# Patient Record
Sex: FEMALE | Race: ASIAN | URBAN - METROPOLITAN AREA
[De-identification: names, ages, dates, MRNs, and addresses within clinical notes are randomized per-mention and may not be internally consistent; named-entity substitution may affect disease eponyms.]

---

## 2017-01-01 ENCOUNTER — ALLIED HEALTH/NURSE VISIT (OUTPATIENT)
Dept: CARDIOLOGY | Facility: CLINIC | Age: 77
End: 2017-01-01
Payer: COMMERCIAL

## 2017-01-01 ENCOUNTER — TELEPHONE (OUTPATIENT)
Dept: CARDIOLOGY | Facility: CLINIC | Age: 77
End: 2017-01-01

## 2017-01-01 ENCOUNTER — HOSPITAL ENCOUNTER (EMERGENCY)
Facility: CLINIC | Age: 77
Discharge: PSYCHIATRIC OR SUBSTANCE USE FACILITY | End: 2017-02-01
Attending: EMERGENCY MEDICINE | Admitting: EMERGENCY MEDICINE
Payer: COMMERCIAL

## 2017-01-01 ENCOUNTER — HOSPITAL ENCOUNTER (INPATIENT)
Facility: CLINIC | Age: 77
LOS: 6 days | Discharge: HOME | DRG: 885 | End: 2017-02-07
Attending: PSYCHIATRY & NEUROLOGY | Admitting: PSYCHIATRY & NEUROLOGY
Payer: COMMERCIAL

## 2017-01-01 ENCOUNTER — TELEPHONE (OUTPATIENT)
Dept: BEHAVIORAL HEALTH | Facility: CLINIC | Age: 77
End: 2017-01-01

## 2017-01-01 VITALS
OXYGEN SATURATION: 100 % | HEART RATE: 60 BPM | DIASTOLIC BLOOD PRESSURE: 87 MMHG | SYSTOLIC BLOOD PRESSURE: 176 MMHG | RESPIRATION RATE: 18 BRPM | TEMPERATURE: 97.9 F

## 2017-01-01 VITALS
WEIGHT: 108.8 LBS | TEMPERATURE: 97.9 F | OXYGEN SATURATION: 99 % | HEART RATE: 60 BPM | RESPIRATION RATE: 16 BRPM | DIASTOLIC BLOOD PRESSURE: 45 MMHG | SYSTOLIC BLOOD PRESSURE: 154 MMHG | HEIGHT: 60 IN | BODY MASS INDEX: 21.36 KG/M2

## 2017-01-01 DIAGNOSIS — I44.30 AV BLOCK: ICD-10-CM

## 2017-01-01 DIAGNOSIS — Z79.4 TYPE 2 DIABETES MELLITUS WITH DIABETIC NEUROPATHY, WITH LONG-TERM CURRENT USE OF INSULIN (H): ICD-10-CM

## 2017-01-01 DIAGNOSIS — R45.851 SUICIDAL IDEATION: ICD-10-CM

## 2017-01-01 DIAGNOSIS — E78.1 HYPERTRIGLYCERIDEMIA: ICD-10-CM

## 2017-01-01 DIAGNOSIS — E78.1 HYPERTRIGLYCERIDEMIA: Primary | ICD-10-CM

## 2017-01-01 DIAGNOSIS — Z95.0 CARDIAC PACEMAKER IN SITU: Primary | ICD-10-CM

## 2017-01-01 DIAGNOSIS — F33.1 MAJOR DEPRESSIVE DISORDER, RECURRENT EPISODE, MODERATE (H): Primary | ICD-10-CM

## 2017-01-01 DIAGNOSIS — I50.22 CHRONIC SYSTOLIC CONGESTIVE HEART FAILURE (H): Primary | ICD-10-CM

## 2017-01-01 DIAGNOSIS — E11.40 TYPE 2 DIABETES MELLITUS WITH DIABETIC NEUROPATHY, WITH LONG-TERM CURRENT USE OF INSULIN (H): ICD-10-CM

## 2017-01-01 DIAGNOSIS — I50.22 CHRONIC SYSTOLIC CONGESTIVE HEART FAILURE (H): ICD-10-CM

## 2017-01-01 LAB
ALT SERPL W P-5'-P-CCNC: 5 U/L (ref 5–30)
ANION GAP SERPL CALCULATED.3IONS-SCNC: 10 MMOL/L (ref 3–14)
BASOPHILS # BLD AUTO: 0 10E9/L (ref 0–0.2)
BASOPHILS NFR BLD AUTO: 0.4 %
BUN SERPL-MCNC: 57 MG/DL (ref 7–30)
CALCIUM SERPL-MCNC: 9.1 MG/DL (ref 8.5–10.1)
CHLORIDE SERPL-SCNC: 106 MMOL/L (ref 94–109)
CHOLEST SERPL-MCNC: 166 MG/DL
CO2 SERPL-SCNC: 25 MMOL/L (ref 20–32)
CREAT SERPL-MCNC: 1.38 MG/DL (ref 0.52–1.04)
DIFFERENTIAL METHOD BLD: ABNORMAL
DIGOXIN SERPL-MCNC: 1.9 UG/L (ref 0.5–2)
EOSINOPHIL # BLD AUTO: 0.2 10E9/L (ref 0–0.7)
EOSINOPHIL NFR BLD AUTO: 3.3 %
ERYTHROCYTE [DISTWIDTH] IN BLOOD BY AUTOMATED COUNT: 13 % (ref 10–15)
GFR SERPL CREATININE-BSD FRML MDRD: 37 ML/MIN/1.7M2
GLUCOSE BLDC GLUCOMTR-MCNC: 113 MG/DL (ref 70–99)
GLUCOSE BLDC GLUCOMTR-MCNC: 139 MG/DL (ref 70–99)
GLUCOSE BLDC GLUCOMTR-MCNC: 147 MG/DL (ref 70–99)
GLUCOSE BLDC GLUCOMTR-MCNC: 149 MG/DL (ref 70–99)
GLUCOSE BLDC GLUCOMTR-MCNC: 158 MG/DL (ref 70–99)
GLUCOSE BLDC GLUCOMTR-MCNC: 160 MG/DL (ref 70–99)
GLUCOSE BLDC GLUCOMTR-MCNC: 168 MG/DL (ref 70–99)
GLUCOSE BLDC GLUCOMTR-MCNC: 169 MG/DL (ref 70–99)
GLUCOSE BLDC GLUCOMTR-MCNC: 170 MG/DL (ref 70–99)
GLUCOSE BLDC GLUCOMTR-MCNC: 176 MG/DL (ref 70–99)
GLUCOSE BLDC GLUCOMTR-MCNC: 189 MG/DL (ref 70–99)
GLUCOSE BLDC GLUCOMTR-MCNC: 190 MG/DL (ref 70–99)
GLUCOSE BLDC GLUCOMTR-MCNC: 190 MG/DL (ref 70–99)
GLUCOSE BLDC GLUCOMTR-MCNC: 191 MG/DL (ref 70–99)
GLUCOSE BLDC GLUCOMTR-MCNC: 202 MG/DL (ref 70–99)
GLUCOSE BLDC GLUCOMTR-MCNC: 209 MG/DL (ref 70–99)
GLUCOSE BLDC GLUCOMTR-MCNC: 211 MG/DL (ref 70–99)
GLUCOSE BLDC GLUCOMTR-MCNC: 224 MG/DL (ref 70–99)
GLUCOSE BLDC GLUCOMTR-MCNC: 226 MG/DL (ref 70–99)
GLUCOSE BLDC GLUCOMTR-MCNC: 227 MG/DL (ref 70–99)
GLUCOSE BLDC GLUCOMTR-MCNC: 231 MG/DL (ref 70–99)
GLUCOSE BLDC GLUCOMTR-MCNC: 231 MG/DL (ref 70–99)
GLUCOSE BLDC GLUCOMTR-MCNC: 232 MG/DL (ref 70–99)
GLUCOSE BLDC GLUCOMTR-MCNC: 233 MG/DL (ref 70–99)
GLUCOSE BLDC GLUCOMTR-MCNC: 248 MG/DL (ref 70–99)
GLUCOSE BLDC GLUCOMTR-MCNC: 253 MG/DL (ref 70–99)
GLUCOSE BLDC GLUCOMTR-MCNC: 255 MG/DL (ref 70–99)
GLUCOSE BLDC GLUCOMTR-MCNC: 256 MG/DL (ref 70–99)
GLUCOSE BLDC GLUCOMTR-MCNC: 257 MG/DL (ref 70–99)
GLUCOSE BLDC GLUCOMTR-MCNC: 263 MG/DL (ref 70–99)
GLUCOSE BLDC GLUCOMTR-MCNC: 267 MG/DL (ref 70–99)
GLUCOSE BLDC GLUCOMTR-MCNC: 283 MG/DL (ref 70–99)
GLUCOSE BLDC GLUCOMTR-MCNC: 293 MG/DL (ref 70–99)
GLUCOSE BLDC GLUCOMTR-MCNC: 301 MG/DL (ref 70–99)
GLUCOSE BLDC GLUCOMTR-MCNC: 314 MG/DL (ref 70–99)
GLUCOSE BLDC GLUCOMTR-MCNC: 329 MG/DL (ref 70–99)
GLUCOSE BLDC GLUCOMTR-MCNC: 350 MG/DL (ref 70–99)
GLUCOSE BLDC GLUCOMTR-MCNC: 364 MG/DL (ref 70–99)
GLUCOSE BLDC GLUCOMTR-MCNC: 43 MG/DL (ref 70–99)
GLUCOSE BLDC GLUCOMTR-MCNC: 94 MG/DL (ref 70–99)
GLUCOSE BLDC GLUCOMTR-MCNC: 98 MG/DL (ref 70–99)
GLUCOSE SERPL-MCNC: 103 MG/DL (ref 70–99)
HBA1C MFR BLD: 8.1 % (ref 4.3–6)
HCT VFR BLD AUTO: 31.7 % (ref 35–47)
HDLC SERPL-MCNC: 27 MG/DL
HGB BLD-MCNC: 10.6 G/DL (ref 11.7–15.7)
IMM GRANULOCYTES # BLD: 0 10E9/L (ref 0–0.4)
IMM GRANULOCYTES NFR BLD: 0.2 %
INR PPP: 1.06 (ref 0.86–1.14)
INTERPRETATION ECG - MUSE: NORMAL
LDLC SERPL CALC-MCNC: ABNORMAL MG/DL
LYMPHOCYTES # BLD AUTO: 2 10E9/L (ref 0.8–5.3)
LYMPHOCYTES NFR BLD AUTO: 37 %
MCH RBC QN AUTO: 31 PG (ref 26.5–33)
MCHC RBC AUTO-ENTMCNC: 33.4 G/DL (ref 31.5–36.5)
MCV RBC AUTO: 93 FL (ref 78–100)
MONOCYTES # BLD AUTO: 0.5 10E9/L (ref 0–1.3)
MONOCYTES NFR BLD AUTO: 8.5 %
NEUTROPHILS # BLD AUTO: 2.7 10E9/L (ref 1.6–8.3)
NEUTROPHILS NFR BLD AUTO: 50.6 %
NONHDLC SERPL-MCNC: 139 MG/DL
NRBC # BLD AUTO: 0 10*3/UL
NRBC BLD AUTO-RTO: 0 /100
PLATELET # BLD AUTO: 190 10E9/L (ref 150–450)
POTASSIUM SERPL-SCNC: 4.2 MMOL/L (ref 3.4–5.3)
RBC # BLD AUTO: 3.42 10E12/L (ref 3.8–5.2)
SODIUM SERPL-SCNC: 141 MMOL/L (ref 133–144)
T4 FREE SERPL-MCNC: 0.74 NG/DL (ref 0.76–1.46)
TRIGL SERPL-MCNC: 654 MG/DL
TSH SERPL DL<=0.005 MIU/L-ACNC: 5.62 MU/L (ref 0.4–4)
WBC # BLD AUTO: 5.4 10E9/L (ref 4–11)

## 2017-01-01 PROCEDURE — 99231 SBSQ HOSP IP/OBS SF/LOW 25: CPT | Performed by: NURSE PRACTITIONER

## 2017-01-01 PROCEDURE — 97150 GROUP THERAPEUTIC PROCEDURES: CPT | Mod: GO

## 2017-01-01 PROCEDURE — 00000146 ZZHCL STATISTIC GLUCOSE BY METER IP

## 2017-01-01 PROCEDURE — 12400002 ZZH R&B MH SENIOR/ADOLESCENT

## 2017-01-01 PROCEDURE — 25900017 H RX MED GY IP 259 OP 259 PS 637: Performed by: PSYCHIATRY & NEUROLOGY

## 2017-01-01 PROCEDURE — 36415 COLL VENOUS BLD VENIPUNCTURE: CPT | Performed by: INTERNAL MEDICINE

## 2017-01-01 PROCEDURE — 80061 LIPID PANEL: CPT | Performed by: INTERNAL MEDICINE

## 2017-01-01 PROCEDURE — 25000131 ZZH RX MED GY IP 250 OP 636 PS 637: Performed by: PHYSICIAN ASSISTANT

## 2017-01-01 PROCEDURE — 36416 COLLJ CAPILLARY BLOOD SPEC: CPT | Performed by: PSYCHIATRY & NEUROLOGY

## 2017-01-01 PROCEDURE — 85025 COMPLETE CBC W/AUTO DIFF WBC: CPT | Performed by: EMERGENCY MEDICINE

## 2017-01-01 PROCEDURE — 25000132 ZZH RX MED GY IP 250 OP 250 PS 637: Performed by: PSYCHIATRY & NEUROLOGY

## 2017-01-01 PROCEDURE — 93005 ELECTROCARDIOGRAM TRACING: CPT

## 2017-01-01 PROCEDURE — 25000131 ZZH RX MED GY IP 250 OP 636 PS 637: Performed by: NURSE PRACTITIONER

## 2017-01-01 PROCEDURE — 90887 INTERPJ/EXPLNAJ RSLT PSYC XM: CPT

## 2017-01-01 PROCEDURE — 99223 1ST HOSP IP/OBS HIGH 75: CPT | Performed by: PHYSICIAN ASSISTANT

## 2017-01-01 PROCEDURE — 25000132 ZZH RX MED GY IP 250 OP 250 PS 637: Performed by: NURSE PRACTITIONER

## 2017-01-01 PROCEDURE — 84443 ASSAY THYROID STIM HORMONE: CPT | Performed by: EMERGENCY MEDICINE

## 2017-01-01 PROCEDURE — 83036 HEMOGLOBIN GLYCOSYLATED A1C: CPT | Performed by: PSYCHIATRY & NEUROLOGY

## 2017-01-01 PROCEDURE — 96374 THER/PROPH/DIAG INJ IV PUSH: CPT

## 2017-01-01 PROCEDURE — 80162 ASSAY OF DIGOXIN TOTAL: CPT | Performed by: EMERGENCY MEDICINE

## 2017-01-01 PROCEDURE — 93280 PM DEVICE PROGR EVAL DUAL: CPT | Performed by: INTERNAL MEDICINE

## 2017-01-01 PROCEDURE — 84439 ASSAY OF FREE THYROXINE: CPT | Performed by: EMERGENCY MEDICINE

## 2017-01-01 PROCEDURE — 36415 COLL VENOUS BLD VENIPUNCTURE: CPT | Performed by: EMERGENCY MEDICINE

## 2017-01-01 PROCEDURE — 90853 GROUP PSYCHOTHERAPY: CPT

## 2017-01-01 PROCEDURE — 85610 PROTHROMBIN TIME: CPT | Performed by: EMERGENCY MEDICINE

## 2017-01-01 PROCEDURE — 99223 1ST HOSP IP/OBS HIGH 75: CPT | Mod: AI | Performed by: NURSE PRACTITIONER

## 2017-01-01 PROCEDURE — 25800025 ZZH RX 258: Performed by: EMERGENCY MEDICINE

## 2017-01-01 PROCEDURE — 84460 ALANINE AMINO (ALT) (SGPT): CPT | Performed by: INTERNAL MEDICINE

## 2017-01-01 PROCEDURE — 99285 EMERGENCY DEPT VISIT HI MDM: CPT | Mod: 25

## 2017-01-01 PROCEDURE — 99207 ZZC NO CHARGE VISIT/PATIENT NOT SEEN: CPT | Performed by: PHYSICIAN ASSISTANT

## 2017-01-01 PROCEDURE — 96361 HYDRATE IV INFUSION ADD-ON: CPT

## 2017-01-01 PROCEDURE — 97532 ZZHC OT COGNITIVE SKILLS EA 15 MIN: CPT | Mod: GO

## 2017-01-01 PROCEDURE — 99232 SBSQ HOSP IP/OBS MODERATE 35: CPT | Performed by: NURSE PRACTITIONER

## 2017-01-01 PROCEDURE — 80048 BASIC METABOLIC PNL TOTAL CA: CPT | Performed by: EMERGENCY MEDICINE

## 2017-01-01 PROCEDURE — 99238 HOSP IP/OBS DSCHRG MGMT 30/<: CPT | Performed by: NURSE PRACTITIONER

## 2017-01-01 PROCEDURE — 25800025 ZZH RX 258

## 2017-01-01 PROCEDURE — 90791 PSYCH DIAGNOSTIC EVALUATION: CPT

## 2017-01-01 RX ORDER — ALUMINA, MAGNESIA, AND SIMETHICONE 2400; 2400; 240 MG/30ML; MG/30ML; MG/30ML
30 SUSPENSION ORAL EVERY 4 HOURS PRN
Status: DISCONTINUED | OUTPATIENT
Start: 2017-01-01 | End: 2017-01-01 | Stop reason: HOSPADM

## 2017-01-01 RX ORDER — CLOPIDOGREL BISULFATE 75 MG/1
75 TABLET ORAL DAILY
Status: DISCONTINUED | OUTPATIENT
Start: 2017-01-01 | End: 2017-01-01

## 2017-01-01 RX ORDER — SERTRALINE HYDROCHLORIDE 25 MG/1
25 TABLET, FILM COATED ORAL DAILY
Status: DISCONTINUED | OUTPATIENT
Start: 2017-01-01 | End: 2017-01-01

## 2017-01-01 RX ORDER — CARVEDILOL 12.5 MG/1
12.5 TABLET ORAL 2 TIMES DAILY WITH MEALS
Qty: 180 TABLET | Refills: 3 | Status: SHIPPED | OUTPATIENT
Start: 2017-01-01 | End: 2017-01-01

## 2017-01-01 RX ORDER — CALCIUM CARBONATE 500 MG/1
500 TABLET, CHEWABLE ORAL 3 TIMES DAILY PRN
Status: DISCONTINUED | OUTPATIENT
Start: 2017-01-01 | End: 2017-01-01 | Stop reason: HOSPADM

## 2017-01-01 RX ORDER — ASPIRIN 81 MG/1
81 TABLET ORAL DAILY
Status: DISCONTINUED | OUTPATIENT
Start: 2017-01-01 | End: 2017-01-01 | Stop reason: HOSPADM

## 2017-01-01 RX ORDER — SIMVASTATIN 40 MG
40 TABLET ORAL AT BEDTIME
Status: DISCONTINUED | OUTPATIENT
Start: 2017-01-01 | End: 2017-01-01 | Stop reason: HOSPADM

## 2017-01-01 RX ORDER — FENOFIBRATE 160 MG/1
160 TABLET ORAL DAILY
Status: DISCONTINUED | OUTPATIENT
Start: 2017-01-01 | End: 2017-01-01 | Stop reason: HOSPADM

## 2017-01-01 RX ORDER — DIGOXIN 125 MCG
125 TABLET ORAL DAILY
Status: DISCONTINUED | OUTPATIENT
Start: 2017-01-01 | End: 2017-01-01 | Stop reason: HOSPADM

## 2017-01-01 RX ORDER — HYDROXYZINE HYDROCHLORIDE 25 MG/1
25-50 TABLET, FILM COATED ORAL EVERY 4 HOURS PRN
Status: DISCONTINUED | OUTPATIENT
Start: 2017-01-01 | End: 2017-01-01 | Stop reason: HOSPADM

## 2017-01-01 RX ORDER — GABAPENTIN 600 MG/1
600 TABLET ORAL 3 TIMES DAILY
Status: DISCONTINUED | OUTPATIENT
Start: 2017-01-01 | End: 2017-01-01 | Stop reason: HOSPADM

## 2017-01-01 RX ORDER — CARVEDILOL 12.5 MG/1
12.5 TABLET ORAL 2 TIMES DAILY WITH MEALS
Status: DISCONTINUED | OUTPATIENT
Start: 2017-01-01 | End: 2017-01-01 | Stop reason: HOSPADM

## 2017-01-01 RX ORDER — ACETAMINOPHEN 325 MG/1
650 TABLET ORAL EVERY 4 HOURS PRN
Status: DISCONTINUED | OUTPATIENT
Start: 2017-01-01 | End: 2017-01-01 | Stop reason: HOSPADM

## 2017-01-01 RX ORDER — BUMETANIDE 0.5 MG/1
0.5 TABLET ORAL
Status: DISCONTINUED | OUTPATIENT
Start: 2017-01-01 | End: 2017-01-01 | Stop reason: HOSPADM

## 2017-01-01 RX ORDER — GEMFIBROZIL 600 MG/1
600 TABLET, FILM COATED ORAL
Status: DISCONTINUED | OUTPATIENT
Start: 2017-01-01 | End: 2017-01-01 | Stop reason: HOSPADM

## 2017-01-01 RX ORDER — LORAZEPAM 2 MG/ML
0.5 INJECTION INTRAMUSCULAR ONCE
Status: DISCONTINUED | OUTPATIENT
Start: 2017-01-01 | End: 2017-01-01 | Stop reason: HOSPADM

## 2017-01-01 RX ORDER — DEXTROSE MONOHYDRATE 25 G/50ML
25-50 INJECTION, SOLUTION INTRAVENOUS
Status: DISCONTINUED | OUTPATIENT
Start: 2017-01-01 | End: 2017-01-01

## 2017-01-01 RX ORDER — SPIRONOLACTONE 25 MG
12.5 TABLET ORAL DAILY
Status: DISCONTINUED | OUTPATIENT
Start: 2017-01-01 | End: 2017-01-01 | Stop reason: HOSPADM

## 2017-01-01 RX ORDER — TRAZODONE HYDROCHLORIDE 50 MG/1
50 TABLET, FILM COATED ORAL
Status: DISCONTINUED | OUTPATIENT
Start: 2017-01-01 | End: 2017-01-01 | Stop reason: HOSPADM

## 2017-01-01 RX ORDER — NICOTINE POLACRILEX 4 MG
15-30 LOZENGE BUCCAL
Status: DISCONTINUED | OUTPATIENT
Start: 2017-01-01 | End: 2017-01-01 | Stop reason: HOSPADM

## 2017-01-01 RX ORDER — CARVEDILOL 12.5 MG/1
12.5 TABLET ORAL 2 TIMES DAILY WITH MEALS
Qty: 180 TABLET | Refills: 3 | Status: SHIPPED | OUTPATIENT
Start: 2017-01-01

## 2017-01-01 RX ORDER — ISOSORBIDE MONONITRATE 30 MG/1
30 TABLET, EXTENDED RELEASE ORAL DAILY
Status: DISCONTINUED | OUTPATIENT
Start: 2017-01-01 | End: 2017-01-01 | Stop reason: HOSPADM

## 2017-01-01 RX ORDER — NICOTINE POLACRILEX 4 MG
15-30 LOZENGE BUCCAL
Status: DISCONTINUED | OUTPATIENT
Start: 2017-01-01 | End: 2017-01-01

## 2017-01-01 RX ORDER — DEXTROSE MONOHYDRATE 25 G/50ML
INJECTION, SOLUTION INTRAVENOUS
Status: COMPLETED
Start: 2017-01-01 | End: 2017-01-01

## 2017-01-01 RX ORDER — DEXTROSE MONOHYDRATE 25 G/50ML
50 INJECTION, SOLUTION INTRAVENOUS ONCE
Status: COMPLETED | OUTPATIENT
Start: 2017-01-01 | End: 2017-01-01

## 2017-01-01 RX ORDER — EZETIMIBE 10 MG/1
10 TABLET ORAL DAILY
Status: DISCONTINUED | OUTPATIENT
Start: 2017-01-01 | End: 2017-01-01 | Stop reason: HOSPADM

## 2017-01-01 RX ORDER — DEXTROSE MONOHYDRATE 25 G/50ML
25-50 INJECTION, SOLUTION INTRAVENOUS
Status: DISCONTINUED | OUTPATIENT
Start: 2017-01-01 | End: 2017-01-01 | Stop reason: HOSPADM

## 2017-01-01 RX ORDER — ACETAMINOPHEN 325 MG/1
650 TABLET ORAL EVERY 4 HOURS PRN
Status: DISCONTINUED | OUTPATIENT
Start: 2017-01-01 | End: 2017-01-01

## 2017-01-01 RX ORDER — FENOFIBRATE 160 MG/1
160 TABLET ORAL DAILY
Qty: 90 TABLET | Refills: 3 | Status: SHIPPED | OUTPATIENT
Start: 2017-01-01

## 2017-01-01 RX ADMIN — CARVEDILOL 12.5 MG: 12.5 TABLET, FILM COATED ORAL at 09:14

## 2017-01-01 RX ADMIN — GEMFIBROZIL 600 MG: 600 TABLET, FILM COATED ORAL at 16:14

## 2017-01-01 RX ADMIN — EZETIMIBE 10 MG: 10 TABLET ORAL at 08:25

## 2017-01-01 RX ADMIN — GABAPENTIN 600 MG: 600 TABLET, FILM COATED ORAL at 13:20

## 2017-01-01 RX ADMIN — GABAPENTIN 600 MG: 600 TABLET, FILM COATED ORAL at 08:38

## 2017-01-01 RX ADMIN — CARVEDILOL 12.5 MG: 12.5 TABLET, FILM COATED ORAL at 08:59

## 2017-01-01 RX ADMIN — FENOFIBRATE 160 MG: 160 TABLET ORAL at 08:59

## 2017-01-01 RX ADMIN — GABAPENTIN 600 MG: 600 TABLET, FILM COATED ORAL at 21:32

## 2017-01-01 RX ADMIN — GABAPENTIN 600 MG: 600 TABLET, FILM COATED ORAL at 21:23

## 2017-01-01 RX ADMIN — FENOFIBRATE 160 MG: 160 TABLET ORAL at 09:09

## 2017-01-01 RX ADMIN — DEXTROSE MONOHYDRATE 50 ML: 25 INJECTION, SOLUTION INTRAVENOUS at 16:01

## 2017-01-01 RX ADMIN — DIGOXIN 125 MCG: 125 TABLET ORAL at 08:59

## 2017-01-01 RX ADMIN — ASPIRIN 81 MG: 81 TABLET, COATED ORAL at 09:09

## 2017-01-01 RX ADMIN — GEMFIBROZIL 600 MG: 600 TABLET, FILM COATED ORAL at 09:14

## 2017-01-01 RX ADMIN — EZETIMIBE 10 MG: 10 TABLET ORAL at 09:07

## 2017-01-01 RX ADMIN — DIGOXIN 125 MCG: 125 TABLET ORAL at 09:08

## 2017-01-01 RX ADMIN — SIMVASTATIN 40 MG: 40 TABLET, FILM COATED ORAL at 21:23

## 2017-01-01 RX ADMIN — DIGOXIN 125 MCG: 125 TABLET ORAL at 08:37

## 2017-01-01 RX ADMIN — SIMVASTATIN 40 MG: 40 TABLET, FILM COATED ORAL at 22:38

## 2017-01-01 RX ADMIN — FENOFIBRATE 160 MG: 160 TABLET ORAL at 09:07

## 2017-01-01 RX ADMIN — GEMFIBROZIL 600 MG: 600 TABLET, FILM COATED ORAL at 06:41

## 2017-01-01 RX ADMIN — GEMFIBROZIL 600 MG: 600 TABLET, FILM COATED ORAL at 17:13

## 2017-01-01 RX ADMIN — CARVEDILOL 12.5 MG: 12.5 TABLET, FILM COATED ORAL at 09:09

## 2017-01-01 RX ADMIN — EZETIMIBE 10 MG: 10 TABLET ORAL at 09:15

## 2017-01-01 RX ADMIN — GABAPENTIN 600 MG: 600 TABLET, FILM COATED ORAL at 22:38

## 2017-01-01 RX ADMIN — CARVEDILOL 12.5 MG: 12.5 TABLET, FILM COATED ORAL at 22:38

## 2017-01-01 RX ADMIN — GABAPENTIN 600 MG: 600 TABLET, FILM COATED ORAL at 14:59

## 2017-01-01 RX ADMIN — GABAPENTIN 600 MG: 600 TABLET, FILM COATED ORAL at 09:14

## 2017-01-01 RX ADMIN — GABAPENTIN 600 MG: 600 TABLET, FILM COATED ORAL at 14:03

## 2017-01-01 RX ADMIN — INSULIN GLARGINE 45 UNITS: 100 INJECTION, SOLUTION SUBCUTANEOUS at 09:02

## 2017-01-01 RX ADMIN — BUMETANIDE 0.5 MG: 0.5 TABLET ORAL at 09:13

## 2017-01-01 RX ADMIN — Medication 12.5 MG: at 09:09

## 2017-01-01 RX ADMIN — FENOFIBRATE 160 MG: 160 TABLET ORAL at 08:26

## 2017-01-01 RX ADMIN — SIMVASTATIN 40 MG: 40 TABLET, FILM COATED ORAL at 20:30

## 2017-01-01 RX ADMIN — SERTRALINE HYDROCHLORIDE 50 MG: 50 TABLET ORAL at 08:24

## 2017-01-01 RX ADMIN — GABAPENTIN 600 MG: 600 TABLET, FILM COATED ORAL at 21:19

## 2017-01-01 RX ADMIN — CALCIUM CARBONATE (ANTACID) CHEW TAB 500 MG 500 MG: 500 CHEW TAB at 14:59

## 2017-01-01 RX ADMIN — CARVEDILOL 12.5 MG: 12.5 TABLET, FILM COATED ORAL at 18:05

## 2017-01-01 RX ADMIN — Medication 12.5 MG: at 08:59

## 2017-01-01 RX ADMIN — ASPIRIN 81 MG: 81 TABLET, COATED ORAL at 08:38

## 2017-01-01 RX ADMIN — GABAPENTIN 600 MG: 600 TABLET, FILM COATED ORAL at 09:09

## 2017-01-01 RX ADMIN — DIGOXIN 125 MCG: 125 TABLET ORAL at 08:25

## 2017-01-01 RX ADMIN — ALUMINUM HYDROXIDE, MAGNESIUM HYDROXIDE, AND DIMETHICONE 30 ML: 400; 400; 40 SUSPENSION ORAL at 21:51

## 2017-01-01 RX ADMIN — CARVEDILOL 12.5 MG: 12.5 TABLET, FILM COATED ORAL at 17:13

## 2017-01-01 RX ADMIN — GABAPENTIN 600 MG: 600 TABLET, FILM COATED ORAL at 20:30

## 2017-01-01 RX ADMIN — GABAPENTIN 600 MG: 600 TABLET, FILM COATED ORAL at 08:24

## 2017-01-01 RX ADMIN — CARVEDILOL 12.5 MG: 12.5 TABLET, FILM COATED ORAL at 17:20

## 2017-01-01 RX ADMIN — CARVEDILOL 12.5 MG: 12.5 TABLET, FILM COATED ORAL at 08:24

## 2017-01-01 RX ADMIN — EZETIMIBE 10 MG: 10 TABLET ORAL at 08:38

## 2017-01-01 RX ADMIN — SIMVASTATIN 40 MG: 40 TABLET, FILM COATED ORAL at 21:44

## 2017-01-01 RX ADMIN — CARVEDILOL 12.5 MG: 12.5 TABLET, FILM COATED ORAL at 09:07

## 2017-01-01 RX ADMIN — BUMETANIDE 0.5 MG: 0.5 TABLET ORAL at 08:59

## 2017-01-01 RX ADMIN — FENOFIBRATE 160 MG: 160 TABLET ORAL at 09:13

## 2017-01-01 RX ADMIN — CARVEDILOL 12.5 MG: 12.5 TABLET, FILM COATED ORAL at 17:28

## 2017-01-01 RX ADMIN — GABAPENTIN 600 MG: 600 TABLET, FILM COATED ORAL at 13:50

## 2017-01-01 RX ADMIN — GABAPENTIN 600 MG: 600 TABLET, FILM COATED ORAL at 08:59

## 2017-01-01 RX ADMIN — GABAPENTIN 600 MG: 600 TABLET, FILM COATED ORAL at 09:08

## 2017-01-01 RX ADMIN — ASPIRIN 81 MG: 81 TABLET, COATED ORAL at 09:06

## 2017-01-01 RX ADMIN — SERTRALINE HYDROCHLORIDE 50 MG: 50 TABLET ORAL at 09:15

## 2017-01-01 RX ADMIN — EZETIMIBE 10 MG: 10 TABLET ORAL at 09:06

## 2017-01-01 RX ADMIN — INSULIN ASPART 5 UNITS: 100 INJECTION, SOLUTION INTRAVENOUS; SUBCUTANEOUS at 17:37

## 2017-01-01 RX ADMIN — INSULIN ASPART 1 UNITS: 100 INJECTION, SOLUTION INTRAVENOUS; SUBCUTANEOUS at 09:03

## 2017-01-01 RX ADMIN — ASPIRIN 81 MG: 81 TABLET, COATED ORAL at 09:14

## 2017-01-01 RX ADMIN — CLOPIDOGREL 75 MG: 75 TABLET, FILM COATED ORAL at 08:38

## 2017-01-01 RX ADMIN — DIGOXIN 125 MCG: 125 TABLET ORAL at 09:15

## 2017-01-01 RX ADMIN — BUMETANIDE 0.5 MG: 0.5 TABLET ORAL at 09:08

## 2017-01-01 RX ADMIN — ISOSORBIDE MONONITRATE 30 MG: 30 TABLET, EXTENDED RELEASE ORAL at 08:59

## 2017-01-01 RX ADMIN — ALUMINUM HYDROXIDE, MAGNESIUM HYDROXIDE, AND DIMETHICONE 30 ML: 400; 400; 40 SUSPENSION ORAL at 20:26

## 2017-01-01 RX ADMIN — ASPIRIN 81 MG: 81 TABLET, COATED ORAL at 08:26

## 2017-01-01 RX ADMIN — ISOSORBIDE MONONITRATE 30 MG: 30 TABLET, EXTENDED RELEASE ORAL at 09:09

## 2017-01-01 RX ADMIN — INSULIN ASPART 2 UNITS: 100 INJECTION, SOLUTION INTRAVENOUS; SUBCUTANEOUS at 12:51

## 2017-01-01 RX ADMIN — SERTRALINE HYDROCHLORIDE 50 MG: 50 TABLET ORAL at 09:09

## 2017-01-01 RX ADMIN — INSULIN GLARGINE 45 UNITS: 100 INJECTION, SOLUTION SUBCUTANEOUS at 12:47

## 2017-01-01 RX ADMIN — BUMETANIDE 0.5 MG: 0.5 TABLET ORAL at 09:09

## 2017-01-01 RX ADMIN — SIMVASTATIN 40 MG: 40 TABLET, FILM COATED ORAL at 21:19

## 2017-01-01 RX ADMIN — GEMFIBROZIL 600 MG: 600 TABLET, FILM COATED ORAL at 06:44

## 2017-01-01 RX ADMIN — INSULIN ASPART 2 UNITS: 100 INJECTION, SOLUTION INTRAVENOUS; SUBCUTANEOUS at 12:48

## 2017-01-01 RX ADMIN — ISOSORBIDE MONONITRATE 30 MG: 30 TABLET, EXTENDED RELEASE ORAL at 08:38

## 2017-01-01 RX ADMIN — FENOFIBRATE 160 MG: 160 TABLET ORAL at 08:38

## 2017-01-01 RX ADMIN — BUMETANIDE 0.5 MG: 0.5 TABLET ORAL at 16:14

## 2017-01-01 RX ADMIN — GEMFIBROZIL 600 MG: 600 TABLET, FILM COATED ORAL at 06:40

## 2017-01-01 RX ADMIN — ISOSORBIDE MONONITRATE 30 MG: 30 TABLET, EXTENDED RELEASE ORAL at 09:14

## 2017-01-01 RX ADMIN — ISOSORBIDE MONONITRATE 30 MG: 30 TABLET, EXTENDED RELEASE ORAL at 08:25

## 2017-01-01 RX ADMIN — GABAPENTIN 600 MG: 600 TABLET, FILM COATED ORAL at 14:01

## 2017-01-01 RX ADMIN — BUMETANIDE 0.5 MG: 0.5 TABLET ORAL at 08:26

## 2017-01-01 RX ADMIN — Medication 12.5 MG: at 09:13

## 2017-01-01 RX ADMIN — BUMETANIDE 0.5 MG: 0.5 TABLET ORAL at 16:04

## 2017-01-01 RX ADMIN — SERTRALINE HYDROCHLORIDE 25 MG: 25 TABLET ORAL at 09:07

## 2017-01-01 RX ADMIN — GEMFIBROZIL 600 MG: 600 TABLET, FILM COATED ORAL at 17:21

## 2017-01-01 RX ADMIN — BUMETANIDE 0.5 MG: 0.5 TABLET ORAL at 17:21

## 2017-01-01 RX ADMIN — GABAPENTIN 600 MG: 600 TABLET, FILM COATED ORAL at 13:17

## 2017-01-01 RX ADMIN — INSULIN GLARGINE 57 UNITS: 100 INJECTION, SOLUTION SUBCUTANEOUS at 09:11

## 2017-01-01 RX ADMIN — BUMETANIDE 0.5 MG: 0.5 TABLET ORAL at 08:38

## 2017-01-01 RX ADMIN — SIMVASTATIN 40 MG: 40 TABLET, FILM COATED ORAL at 21:32

## 2017-01-01 RX ADMIN — Medication 12.5 MG: at 08:24

## 2017-01-01 RX ADMIN — Medication 12.5 MG: at 08:38

## 2017-01-01 RX ADMIN — SERTRALINE HYDROCHLORIDE 25 MG: 25 TABLET ORAL at 08:38

## 2017-01-01 RX ADMIN — GEMFIBROZIL 600 MG: 600 TABLET, FILM COATED ORAL at 08:37

## 2017-01-01 RX ADMIN — BUMETANIDE 0.5 MG: 0.5 TABLET ORAL at 16:52

## 2017-01-01 RX ADMIN — GEMFIBROZIL 600 MG: 600 TABLET, FILM COATED ORAL at 16:04

## 2017-01-01 RX ADMIN — ISOSORBIDE MONONITRATE 30 MG: 30 TABLET, EXTENDED RELEASE ORAL at 09:08

## 2017-01-01 RX ADMIN — GEMFIBROZIL 600 MG: 600 TABLET, FILM COATED ORAL at 16:52

## 2017-01-01 RX ADMIN — GABAPENTIN 600 MG: 600 TABLET, FILM COATED ORAL at 19:51

## 2017-01-01 RX ADMIN — ACETAMINOPHEN 650 MG: 325 TABLET, FILM COATED ORAL at 22:33

## 2017-01-01 RX ADMIN — TRAZODONE HYDROCHLORIDE 50 MG: 50 TABLET ORAL at 22:33

## 2017-01-01 RX ADMIN — EZETIMIBE 10 MG: 10 TABLET ORAL at 09:09

## 2017-01-01 RX ADMIN — DEXTROSE AND SODIUM CHLORIDE: 5; 450 INJECTION, SOLUTION INTRAVENOUS at 16:17

## 2017-01-01 RX ADMIN — CARVEDILOL 12.5 MG: 12.5 TABLET, FILM COATED ORAL at 08:38

## 2017-01-01 RX ADMIN — SERTRALINE HYDROCHLORIDE 50 MG: 50 TABLET ORAL at 08:59

## 2017-01-01 RX ADMIN — Medication 12.5 MG: at 09:08

## 2017-01-01 RX ADMIN — GEMFIBROZIL 600 MG: 600 TABLET, FILM COATED ORAL at 06:46

## 2017-01-01 RX ADMIN — ACETAMINOPHEN 650 MG: 325 TABLET, FILM COATED ORAL at 22:36

## 2017-01-01 RX ADMIN — ASPIRIN 81 MG: 81 TABLET, COATED ORAL at 08:59

## 2017-01-01 RX ADMIN — BUMETANIDE 0.5 MG: 0.5 TABLET ORAL at 17:13

## 2017-01-01 RX ADMIN — DIGOXIN 125 MCG: 125 TABLET ORAL at 09:09

## 2017-01-01 RX ADMIN — CARVEDILOL 12.5 MG: 12.5 TABLET, FILM COATED ORAL at 17:36

## 2017-01-01 ASSESSMENT — ACTIVITIES OF DAILY LIVING (ADL)
DRESS: INDEPENDENT;STREET CLOTHES
ORAL_HYGIENE: WITH ASSISTANCE
GROOMING: INDEPENDENT
DRESS: WITH ASSISTANCE
ORAL_HYGIENE: INDEPENDENT
LAUNDRY: UNABLE TO COMPLETE
HYGIENE/GROOMING: INDEPENDENT
LAUNDRY: UNABLE TO COMPLETE
DRESS: INDEPENDENT
GROOMING: WITH ASSISTANCE
DRESS: WITH ASSISTANCE
GROOMING: INDEPENDENT
ORAL_HYGIENE: INDEPENDENT
ORAL_HYGIENE: WITH ASSISTANCE
GROOMING: WITH ASSISTANCE
DRESS: INDEPENDENT
LAUNDRY: UNABLE TO COMPLETE
ORAL_HYGIENE: INDEPENDENT
GROOMING: WITH ASSISTANCE
GROOMING: INDEPENDENT
ORAL_HYGIENE: INDEPENDENT
ORAL_HYGIENE: INDEPENDENT
DRESS: SCRUBS (BEHAVIORAL HEALTH)
GROOMING: INDEPENDENT
DRESS: SCRUBS (BEHAVIORAL HEALTH);WITH ASSISTANCE
DRESS: STREET CLOTHES;INDEPENDENT
DRESS: WITH ASSISTANCE
GROOMING: WITH ASSISTANCE
LAUNDRY: UNABLE TO COMPLETE
DRESS: WITH ASSISTANCE
GROOMING: INDEPENDENT
GROOMING: WITH ASSISTANCE
ORAL_HYGIENE: WITH ASSISTANCE
ORAL_HYGIENE: INDEPENDENT
ORAL_HYGIENE: WITH ASSISTANCE
ORAL_HYGIENE: INDEPENDENT
DRESS: WITH ASSISTANCE;STREET CLOTHES

## 2017-01-01 ASSESSMENT — ENCOUNTER SYMPTOMS: CONFUSION: 0

## 2017-01-13 NOTE — PROGRESS NOTES
[facility] Accent (D) Pacemaker Device Check  AP: 75 % : 10 %  Mode: DDDR 60--120        Underlying Rhythm: SB in the 30's with a first degree AVB  Heart Rate: adequate variability  Sensing: WNL    Pacing Threshold: WNL   Impedance: WNL  Battery Status: 5.8 yrs estimated longevity  Atrial Arrhythmia: none  Ventricular Arrhythmia: none  Note: Presenting rhythm showed AP/ at 70 bpm, but surface EKG showed intrinsic QRS complex even though device was V pacing. Ran strips in AAI and VVI to compare paced and sensed QRS. Discussed with Jayde RN and Anjum RN. Attempted AAI pacing at 100bpm, pt had 4:1 AV block, so unable to reprogram to AAI or VIP.   Setting Change: Lowered LRL from 70 to 60 and increased paced AV delay from 300ms to 350ms to encourage intrinsic V response. Presenting rhythm now AP/VS at 60 bpm.     Care Plan: office teletrace in 3 months. Patient's daughter did not have her calendar with, provided phone number for daughter to call and schedule, order placed in Epic.    Rafaela

## 2017-02-01 PROBLEM — F32.A DEPRESSION: Status: ACTIVE | Noted: 2017-01-01

## 2017-02-01 NOTE — ED NOTES
ATTEMPTED TO CALL REPORT TO  [facility] @[phone]. IT WAS STATED THAT THEY WERE UNABLE TO TAKE REPORT AT THIS TIME AND THEY WOULD CALL WHEN READY.

## 2017-02-01 NOTE — ED NOTES
ATTEMPTED TO CALL REPORT TO [facility]B [facility] @[phone]. IT WAS STATED THAT THEY WERE UNABLE TO TAKE REPORT AT THIS TIME AND THEY WOULD CALL WHEN READY, MAYBE AROUND 6PM.
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
## 2017-02-01 NOTE — TELEPHONE ENCOUNTER
Daughter called stating that pt is supposed to be scheduled for an appt with Dr. Baldwin. Orders are already entered. Writer tx pt daughter to scheduling. Pt scheduled for 3/22/17 with Dr. Baldwin

## 2017-02-01 NOTE — ED NOTES
Assisted with patient triage (ambulance). While speaking with the Pt. She had told me that she normally stays at her daughters house. When she is their she cleans the house all day and when the daughter gets home she will find a piece of paper or a dirty spot and will yell at her and tell Pt. Why do you even live here. The Pt. Went on to tell me that this happens quite often. There is also some confusion about the daughters  or boyfriend also saying some of the same things to the Pt. RN was notified

## 2017-02-01 NOTE — ED NOTES
Assisted with Pt. Ambulance triage, Applied monitoring devices (BP, and pulse ox) onto Patient. Oral temp taken

## 2017-02-01 NOTE — IP AVS SNAPSHOT
[facility]    [address]    [place] [address]    Phone:  [phone]                                       After Visit Summary   2/1/2017    Ramón Galarza    MRN: [id number]           After Visit Summary Signature Page     I have received my discharge instructions, and my questions have been answered. I have discussed any challenges I see with this plan with the nurse or doctor.    ..........................................................................................................................................  Patient/Patient Representative Signature      ..........................................................................................................................................  Patient Representative Print Name and Relationship to Patient    ..................................................               ................................................  Date                                            Time    ..........................................................................................................................................  Reviewed by Signature/Title    ...................................................              ..............................................  Date                                                            Time

## 2017-02-01 NOTE — IP AVS SNAPSHOT
MRN:[id number]                      After Visit Summary   2/1/2017    Ramón Galarza    MRN: [id number]           Thank you!     Thank you for choosing [facility] for your care. Our goal is always to provide you with excellent care.        Patient Information     Date Of Birth          1940        About your hospital stay     You were admitted on:  February 1, 2017 You last received care in the:  [address]    You were discharged on:  February 7, 2017       Who to Call     For medical emergencies, please call 911.  For non-urgent questions about your medical care, please call your primary care provider or clinic, [phone]          Attending Provider     Provider    Yaneth Miranda MD       Primary Care Provider Office Phone # Fax #    Lashaun Valenzuela [phone]       [facility] [address] [facility] [address]  [facility] [address]        Your next 10 appointments already scheduled     Mar 22, 2017  9:00 AM   Return Visit with Bahman Baldwin MD   [facility]    [address]  [facility] [address] [phone]            Apr 19, 2017  8:10 AM   Pacemaker Check with RU DCR2   [facility])    [address]  [facility] [id number] [phone]              Further instructions from your care team       Behavioral Discharge Planning and Instructions   Summary:   You were admitted to the [facility] on February 1, 2017 for suicidal ideation. While on the unit, your symptoms stabilized. You deny any current suicidal or homicidal ideation. You are discharged today to [address] [place], [place] [address].  Phone number: [phone]    Main Diagnosis:   Depression.     Major Treatments, Procedures and Findings:   Dr. Miranda, your treating psychiatrist, adjusted your medications and managed your care  throughout your stay. An internal medicine consult was completed during your stay. You had the opportunity to participate in treatment programming while on the unit including occupational therapy, mental health support and education and spiritual services.     Symptoms to Report:   Increased confusion, losing more sleep, mood getting worse, and/or thoughts of suicide.     Lifestyle Adjustment:   Stay in touch with your psychiatrist for any questions, and contact the doctor as soon as you experience a recurrence of depressive symptoms. Don't wait for the symptoms to progress to severe before getting help.    Follow-up Appointments:     Pt's daughter, Ricarda, will schedule all follow up appointments.    :  Lydia Dinero  [facility]  [phone]    Resources:   [facility] Crisis Team (24 hour/7days a week): [phone].            Crisis Intervention: 919.801.2554 or 008-213-4451 (TTY: 173.700.1623). Call anytime for help.   [place] on Mental Illness (www.[place].org): 331.296.3157 or 120-226-0077.   Mental Health Consumer/Survivor Network of [place] (www.mhcsn.net): 837.597.8724 or 706-080-8082  Mental Health Association of [place] (www.mentalhealth.org): 508.348.1707 or 245-541-0605    General Medication Instructions:   See your medication sheet(s) for instructions.   Take all medicines as directed. Make no changes unless your doctor suggests them.   Go to all your doctor visits.   Be sure to have all your required lab tests. This way, your medicines can be refilled on time.   Do not use any drugs not prescribed by your doctor.   Avoid alcohol.    The treatment team has appreciated the opportunity to work with you.  We wish you the best in the future. You will be receiving a follow up call within the next three days from a representative from behavioral health. You have identified the best phone number to reach you as [phone]. If you have any questions or concerns our unit number is  [phone].      DIABETES PLAN-  Lantus 57 units every  Morning  Novolog 10 units with each meal  Check blood sugar before meals and at bedtime  Report to diabetes clinic ([facility] or Dr. Arias) if glucose running consistently less than 100 or greater than 250.    Pending Results     No orders found from 2017 to 2017.            Admission Information        Provider Department Dept Phone    2017 Yaneth Miranda MD  [address] [phone]      Your Vitals Were     Blood Pressure Pulse Temperature    154/45 mmHg 60 97.9  F (36.6  C) (Tympanic)    Respirations Height Weight    16 1.524 m (5') 49.351 kg (108 lb 12.8 oz)    BMI (Body Mass Index) Pulse Oximetry       21.25 kg/m2 99%       MyChart Information     [employer] lets you send messages to your doctor, view your test results, renew your prescriptions, schedule appointments and more. To sign up, go to [email or web address] . Click on \"Log in\" on the left side of the screen, which will take you to the Welcome page. Then click on \"Sign up Now\" on the right side of the page.     You will be asked to enter the access code listed below, as well as some personal information. Please follow the directions to create your username and password.     Your access code is: [email or web address]  Expires: 2017 12:45 PM     Your access code will  in 90 days. If you need help or a new code, please call your [facility] clinic or [phone].        Care EveryWhere ID     This is your Care EveryWhere ID. This could be used by other organizations to access your [facility] medical records  [id number]           Review of your medicines      START taking        Dose / Directions    insulin aspart 100 UNIT/ML injection   Commonly known as:  NovoLOG PEN   Used for:  Type 2 diabetes mellitus with diabetic neuropathy, with long-term current use of insulin (H)        Dose:  10 Units   Inject 10 Units Subcutaneous 3 times daily (with meals)   Quantity:  9 mL   Refills:  0    "      CONTINUE these medicines which may have CHANGED, or have new prescriptions. If we are uncertain of the size of tablets/capsules you have at home, strength may be listed as something that might have changed.        Dose / Directions    insulin glargine 100 UNIT/ML injection   Commonly known as:  LANTUS   This may have changed:    - how much to take  - when to take this   Used for:  Type 2 diabetes mellitus with diabetic neuropathy, with long-term current use of insulin (H)        Dose:  57 Units   Inject 57 Units Subcutaneous every morning (before breakfast)   Quantity:  17.1 mL   Refills:  0       sertraline 50 MG tablet   Commonly known as:  ZOLOFT   This may have changed:    - medication strength  - how much to take   Used for:  Major depressive disorder, recurrent episode, moderate (H)        Dose:  50 mg   Take 1 tablet (50 mg) by mouth daily   Quantity:  30 tablet   Refills:  0         CONTINUE these medicines which have NOT CHANGED        Dose / Directions    aspirin 81 MG tablet   Used for:  Type 2 diabetes, HbA1C goal < 8% (H), CAD (coronary artery disease), PAD (peripheral artery disease) (H)        Dose:  1 tablet   Take 1 tablet by mouth daily.   Quantity:  3 tablet   Refills:  3       bumetanide 0.5 MG tablet   Commonly known as:  BUMEX   Used for:  CHF (congestive heart failure) (H), CAD (coronary artery disease), Acute MI, inferolateral wall, subsequent episode of care (H)        Dose:  0.5 mg   Take 1 tablet (0.5 mg) by mouth 2 times daily Give 0.5mg two times a day for CONGESTIVE HEART FAILURE. Hold if SBP <90 and call NP.   Quantity:  180 tablet   Refills:  3       carvedilol 12.5 MG tablet   Commonly known as:  COREG   Used for:  Chronic systolic congestive heart failure (H)        Dose:  12.5 mg   Take 1 tablet (12.5 mg) by mouth 2 times daily (with meals) Hold if HR <55 or SBP < 100 and call NP   Quantity:  180 tablet   Refills:  3       digoxin 125 MCG tablet   Commonly known as:  LANOXIN    Used for:  CHF (congestive heart failure) (H)        Dose:  125 mcg   Take 1 tablet (125 mcg) by mouth daily Hold If HR <55 and notify NP   Quantity:  90 tablet   Refills:  0       fenofibrate 160 MG tablet   Used for:  Hypertriglyceridemia        Dose:  160 mg   Take 1 tablet (160 mg) by mouth daily   Quantity:  90 tablet   Refills:  3       gabapentin 600 MG tablet   Commonly known as:  NEURONTIN        Dose:  600 mg   Take 600 mg by mouth 3 times daily   Refills:  0       IMDUR PO        Dose:  30 mg   Take 30 mg by mouth daily   Refills:  0       LOPID PO        Dose:  600 mg   Take 600 mg by mouth 2 times daily (before meals)   Refills:  0       simvastatin 40 MG tablet   Commonly known as:  ZOCOR   Used for:  Hyperlipemia        Dose:  40 mg   Take 1 tablet (40 mg) by mouth At Bedtime   Quantity:  90 tablet   Refills:  3       spironolactone 25 MG tablet   Commonly known as:  ALDACTONE   Used for:  CHF (congestive heart failure) (H)        Dose:  12.5 mg   Take 0.5 tablets (12.5 mg) by mouth daily   Quantity:  45 tablet   Refills:  3       ZETIA PO        Dose:  10 mg   Take 10 mg by mouth   Refills:  0         STOP taking     clopidogrel 75 MG tablet   Commonly known as:  PLAVIX           COZAAR PO           insulin lispro 100 UNIT/ML injection   Commonly known as:  HumaLOG PEN           TYLENOL PO                Where to get your medicines      These medications were sent to [facility] [place], [place] - [address]  [address] [address]     Phone:  [phone]    - insulin aspart 100 UNIT/ML injection  - insulin glargine 100 UNIT/ML injection      Some of these will need a paper prescription and others can be bought over the counter. Ask your nurse if you have questions.     Bring a paper prescription for each of these medications    - sertraline 50 MG tablet             Protect others around you: Learn how to safely use, store and throw away your medicines at  www.disposemymeds.org.             Medication List: This is a list of all your medications and when to take them. Check marks below indicate your daily home schedule. Keep this list as a reference.      Medications           Morning Afternoon Evening Bedtime As Needed    aspirin 81 MG tablet   Take 1 tablet by mouth daily.                                   bumetanide 0.5 MG tablet   Commonly known as:  BUMEX   Take 1 tablet (0.5 mg) by mouth 2 times daily Give 0.5mg two times a day for CONGESTIVE HEART FAILURE. Hold if SBP <90 and call NP.   Last time this was given:  0.5 mg on 2/7/2017  9:13 AM                       4PM               carvedilol 12.5 MG tablet   Commonly known as:  COREG   Take 1 tablet (12.5 mg) by mouth 2 times daily (with meals) Hold if HR <55 or SBP < 100 and call NP   Last time this was given:  12.5 mg on 2/7/2017  9:14 AM                       6PM               digoxin 125 MCG tablet   Commonly known as:  LANOXIN   Take 1 tablet (125 mcg) by mouth daily Hold If HR <55 and notify NP   Last time this was given:  125 mcg on 2/7/2017  9:15 AM                                   fenofibrate 160 MG tablet   Take 1 tablet (160 mg) by mouth daily   Last time this was given:  160 mg on 2/7/2017  9:13 AM                                   gabapentin 600 MG tablet   Commonly known as:  NEURONTIN   Take 600 mg by mouth 3 times daily   Last time this was given:  600 mg on 2/7/2017  2:01 PM                   2PM                      IMDUR PO   Take 30 mg by mouth daily   Last time this was given:  30 mg on 2/7/2017  9:14 AM                                   insulin aspart 100 UNIT/ML injection   Commonly known as:  NovoLOG PEN   Inject 10 Units Subcutaneous 3 times daily (with meals)   Last time this was given:  2 Units on 2/7/2017 12:34 PM            With breakfast       With lunch       With supper               insulin glargine 100 UNIT/ML injection   Commonly known as:  LANTUS   Inject 57 Units  Subcutaneous every morning (before breakfast)   Last time this was given:  57 Units on 2/7/2017  9:11 AM                                   LOPID PO   Take 600 mg by mouth 2 times daily (before meals)   Last time this was given:  600 mg on 2/7/2017  9:14 AM                       430PM               sertraline 50 MG tablet   Commonly known as:  ZOLOFT   Take 1 tablet (50 mg) by mouth daily   Last time this was given:  50 mg on 2/7/2017  9:15 AM                                   simvastatin 40 MG tablet   Commonly known as:  ZOCOR   Take 1 tablet (40 mg) by mouth At Bedtime   Last time this was given:  40 mg on 2/6/2017  8:30 PM                                   spironolactone 25 MG tablet   Commonly known as:  ALDACTONE   Take 0.5 tablets (12.5 mg) by mouth daily   Last time this was given:  12.5 mg on 2/7/2017  9:13 AM                                   ZETIA PO   Take 10 mg by mouth   Last time this was given:  10 mg on 2/7/2017  9:15 AM

## 2017-02-01 NOTE — ED NOTES
Bed: ED32  Expected date: 2/1/17  Expected time: 12:01 PM  Means of arrival: Ambulance  Comments:  RANDY 77 YO F Suicidal
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
## 2017-02-01 NOTE — ED NOTES
pt comes in by ambulance from [facility] where pt was over heard by staff mading threats about going home and taking a bunch of pills and killing self, pt has a hx of attempting to drive wheel chair into traffic in a attemt tp kill self. pt has some cardiac hx and leg amputation. pt nimo was notified, daughter is pt POA and daughter is on the way. pt refused blood sugar BUT DID ALLOW TO HAVE VITALS COMPLETED. EMS PLACED PT ON HOLD, AFTER PT ATTEMTED TO REFUSE TO COME TO ER. PT DID MENTION SOMETHING ABOUT THE RECENT DEATH OF A LOVED ONE.

## 2017-02-02 NOTE — H&P
"DATE OF ADMISSION: 2/1/2017                                 PATIENT'S [id number]   DATE OF SERVICE: 2/2/2017                                           PATIENT'S: 1940  ADMITTING PHYSICIAN: Yaneth Miranda MD  ATTENDING PROVIDER: Sanjay HARRIS DNP  LEGAL STATUS:  Voluntary  SOURCES OF INFORMATION: Information was obtained from the patient and available records.  CHIEF COMPLAIN: \"My daughter doesn't want me\"  HISTORY F PRESENT ILLNESS: Ramón Galarza is a 76 year old female admited for worsening depression and suicidal ideation with a plan to overdose on her medications. Patient is a poor historian. States she is depressed because her daughter doesn't want to take care of her and wants her to go to an assisted living. Patient feels that her daughter is obligated to take care of her and feels shame that she doesn't want her. Patient is from [place] and explains that in her culture children takes are of their parents when they get old. Yesterday morning she had a fight with her daughter and when she whent to her  center she told the staff that she wants to die. Patient feels sad about the situation but not necessary depressed. She rates her mood as \"OK\". Denies anxiety, low energy, and inability to sleep. She is eating well. Denies panic attacks, hallucinations, delusions. Denies suicidal and homicidal ideation. Denies self injuries behaviors, memory problems, obsessions, compulsions, and specific fears.  Denies manic symptoms. Patient is difficult to understand ans she mumbles and speaks very fast.    Per her daughter, patient has been depressed on and off for many years. She attempted suicide in 2011 by driving her wheel chare into the upcoming traffic. She was hospitalized at [facility] for about a week. Patient moved with her 6-7 years ago. She goes to  two times a week and even thought she like it, patient is not whiling to increase the time she spends there. Patient appear to be " demending a lot of her daughters attention which have led to caregiver burnout.   Patient is unable to tell me if she has been on any psychiatric medications in the past. She is hyperverbal and disorganized. Her speech is circumstantial. She appear to confabulate a bit.  Patient can't read and write English.   SUBSTANCE USE HISTORY: No history of substance use  PSYCHIATRIC HISTORY:   Depression.   One prior hospitalization in 2011 after an suicide attempt at [facility].    PAST MEDICAL HISTORY:   I have reviewed this patient's past medical history which include: DMII, HTN, CAD, PAD, CKD, Pacemaker  I have reviewed this patient's past surgical history: CABG in 2015, Above the knee amputation, 2010.     Denies seizures, head injuries, and loss of consciousness.  ALLERGIES:    Allergies   Allergen Reactions     Lisinopril Cough     Losartan Other (See Comments)     Violent shaking     Niacin      Vitamin D      FAMILY HISTORY: Denies.   SOCIAL HISTORY:         Early history: Born and raised in [place]. Moved to the  [place] years ago.    Educational history: Not clear   Marital history:    Children: 1 daughter 48 years old, and 2 sons 41 and 43 y.o.   Current living situation: With daughter   Occupational history: Worked in a factory   Occupational history/current financial support: Retired     history: none     Legal history: none  MEDICAL REVIEW OF SYSTEM:  Please refer to the review of systems done by Karen ABDALLA on 2/2/2017, which I reviewed and confirmed.   MEDICATIONS PRIOR TO ADMISSION:   Prior to Admission medications    Medication Sig Start Date End Date Taking? Authorizing Provider   Ezetimibe (ZETIA PO) Take 10 mg by mouth   Yes Reported, Patient   Gemfibrozil (LOPID PO) Take 600 mg by mouth 2 times daily (before meals)   Yes Reported, Patient   Isosorbide Mononitrate (IMDUR PO) Take 30 mg by mouth daily   Yes Reported, Patient   carvedilol (COREG) 12.5 MG tablet Take 1 tablet (12.5 mg) by mouth 2  times daily (with meals) Hold if HR <55 or SBP < 100 and call NP 1/26/17  Yes Bahman Baldwin MD   fenofibrate 160 MG tablet Take 1 tablet (160 mg) by mouth daily 1/17/17  Yes Bahman Baldwin MD   clopidogrel (PLAVIX) 75 MG tablet Take 1 tablet (75 mg) by mouth daily 11/7/16  Yes Bahman Baldwin MD   bumetanide (BUMEX) 0.5 MG tablet Take 1 tablet (0.5 mg) by mouth 2 times daily Give 0.5mg two times a day for CONGESTIVE HEART FAILURE. Hold if SBP <90 and call NP. 11/7/16  Yes Bahman Baldwin MD   simvastatin (ZOCOR) 40 MG tablet Take 1 tablet (40 mg) by mouth At Bedtime 8/2/16  Yes Bahman Baldwin MD   spironolactone (ALDACTONE) 25 MG tablet Take 0.5 tablets (12.5 mg) by mouth daily 7/28/16  Yes Bahman Baldwin MD   insulin lispro (HUMALOG PEN) 100 UNIT/ML soln Inject Subcutaneous 3 times daily (before meals) 28 units for small meal; 30 units for larger meal   Yes Reported, Patient   insulin glargine (LANTUS) 100 UNIT/ML PEN Inject 62 Units Subcutaneous 2 times daily    Yes Reported, Patient   digoxin (LANOXIN) 125 MCG tablet Take 1 tablet (125 mcg) by mouth daily Hold If HR <55 and notify NP 8/7/15  Yes Bahman Baldwin MD   Acetaminophen (TYLENOL PO) Take 650 mg by mouth every 4 hours as needed for mild pain or fever   Yes Reported, Patient   sertraline (ZOLOFT) 25 MG tablet Take 25 mg by mouth daily   Yes Unknown, Entered By History   aspirin 81 MG tablet Take 1 tablet by mouth daily. 9/20/11  Yes Amy Morton MD   gabapentin (NEURONTIN) 600 MG tablet Take 600 mg by mouth 3 times daily    Yes Reported, Patient   Losartan Potassium (COZAAR PO) Take 50 mg by mouth    Reported, Patient     LABORATORY DATA:   Recent Results (from the past 672 hour(s))   Lipid Profile    Collection Time: 01/13/17  8:09 AM   Result Value Ref Range    Cholesterol 166 <200 mg/dL    Triglycerides 654 (H) <150 mg/dL    HDL Cholesterol 27 (L) >49 mg/dL    LDL Cholesterol Calculated  <100 mg/dL     Cannot estimate LDL  when triglyceride exceeds 400 mg/dL   Desirable:       <100 mg/dl      Non HDL Cholesterol 139 (H) <130 mg/dL   ALT    Collection Time: 01/13/17  8:09 AM   Result Value Ref Range    ALT 5 5 - 30 U/L   Glucose by meter    Collection Time: 02/01/17 12:12 PM   Result Value Ref Range    Glucose 139 (H) 70 - 99 mg/dL   CBC with platelets differential    Collection Time: 02/01/17  1:00 PM   Result Value Ref Range    WBC 5.4 4.0 - 11.0 10e9/L    RBC Count 3.42 (L) 3.8 - 5.2 10e12/L    Hemoglobin 10.6 (L) 11.7 - 15.7 g/dL    Hematocrit 31.7 (L) 35.0 - 47.0 %    MCV 93 78 - 100 fl    MCH 31.0 26.5 - 33.0 pg    MCHC 33.4 31.5 - 36.5 g/dL    RDW 13.0 10.0 - 15.0 %    Platelet Count 190 150 - 450 10e9/L    Diff Method Automated Method     % Neutrophils 50.6 %    % Lymphocytes 37.0 %    % Monocytes 8.5 %    % Eosinophils 3.3 %    % Basophils 0.4 %    % Immature Granulocytes 0.2 %    Nucleated RBCs 0 0 /100    Absolute Neutrophil 2.7 1.6 - 8.3 10e9/L    Absolute Lymphocytes 2.0 0.8 - 5.3 10e9/L    Absolute Monocytes 0.5 0.0 - 1.3 10e9/L    Absolute Eosinophils 0.2 0.0 - 0.7 10e9/L    Absolute Basophils 0.0 0.0 - 0.2 10e9/L    Abs Immature Granulocytes 0.0 0 - 0.4 10e9/L    Absolute Nucleated RBC 0.0    Basic metabolic panel    Collection Time: 02/01/17  1:00 PM   Result Value Ref Range    Sodium 141 133 - 144 mmol/L    Potassium 4.2 3.4 - 5.3 mmol/L    Chloride 106 94 - 109 mmol/L    Carbon Dioxide 25 20 - 32 mmol/L    Anion Gap 10 3 - 14 mmol/L    Glucose 103 (H) 70 - 99 mg/dL    Urea Nitrogen 57 (H) 7 - 30 mg/dL    Creatinine 1.38 (H) 0.52 - 1.04 mg/dL    GFR Estimate 37 (L) >60 mL/min/1.7m2    GFR Estimate If Black 45 (L) >60 mL/min/1.7m2    Calcium 9.1 8.5 - 10.1 mg/dL   INR    Collection Time: 02/01/17  1:00 PM   Result Value Ref Range    INR 1.06 0.86 - 1.14   Digoxin level    Collection Time: 02/01/17  1:00 PM   Result Value Ref Range    Digoxin Level 1.9 0.5 - 2.0 ug/L   TSH with free T4 reflex    Collection Time:  "02/01/17  1:00 PM   Result Value Ref Range    TSH 5.62 (H) 0.40 - 4.00 mU/L   T4 free    Collection Time: 02/01/17  1:00 PM   Result Value Ref Range    T4 Free 0.74 (L) 0.76 - 1.46 ng/dL   EKG 12-lead, tracing only    Collection Time: 02/01/17  1:09 PM   Result Value Ref Range    Interpretation ECG Click View Image link to view waveform and result    Glucose by meter    Collection Time: 02/01/17  3:48 PM   Result Value Ref Range    Glucose 43 (LL) 70 - 99 mg/dL   Glucose by meter    Collection Time: 02/01/17  4:26 PM   Result Value Ref Range    Glucose 227 (H) 70 - 99 mg/dL   Glucose by meter    Collection Time: 02/01/17  5:33 PM   Result Value Ref Range    Glucose 231 (H) 70 - 99 mg/dL   Glucose by meter    Collection Time: 02/01/17  9:32 PM   Result Value Ref Range    Glucose 202 (H) 70 - 99 mg/dL   Glucose by meter    Collection Time: 02/02/17  2:05 AM   Result Value Ref Range    Glucose 283 (H) 70 - 99 mg/dL   Glucose by meter    Collection Time: 02/02/17  8:25 AM   Result Value Ref Range    Glucose 98 70 - 99 mg/dL   Hemoglobin A1c    Collection Time: 02/02/17 10:50 AM   Result Value Ref Range    Hemoglobin A1C 8.1 (H) 4.3 - 6.0 %   Glucose by meter    Collection Time: 02/02/17 11:46 AM   Result Value Ref Range    Glucose 190 (H) 70 - 99 mg/dL     PHYSICAL EXAMINATON:   Temp: 99.5  F (37.5  C) Temp src: Tympanic BP: 127/44 mmHg Pulse: 61   Resp: 18 SpO2: 99 % O2 Device: None (Room air)    5' 0\" 114 lbs 0 oz Body mass index is 22.26 kg/(m^2).  MENTAL STATUS EXAM:      Appearance: awake, alert and well groomed  Attitude:  cooperative  Eye Contact:  good  Mood:  good  Affect:  appropriate and in normal range  Speech:  pressured speech, mumbling and rambling  Psychomotor Behavior:  no evidence of tardive dyskinesia, dystonia, or tics  Throught Process:  disorganized and circumstantial  Associations:  no loose associations  Thought Content:  no evidence of suicidal ideation or homicidal ideation, no auditory " hallucinations present and no visual hallucinations present  Insight:  limited  Judgement:  limited  Oriented to:  time, person, and place, partly to situation  Attention Span and Concentration:  limited  Recent and Remote Memory:  limited  Language and Fund of Knowledge: adequate for the level of education and training.  DIAGNOSIS:  1. Major Depressive Disorder, recurrent, moderate  PLAN AND RECOMMENDATIONS:  1. Increase Zoloft to 50 mg for depression  2. Continue prn Hydroxyzine for depression, Trazodone for sleep, Zyprexa for agitation.   The patient was consulted on nature of illness and treatment options. She is in agreement with the plan. Care was coordinated with the treatment team.   Attestation: Patient has been seen and evaluated by shiv HARRIS DNP.  2/2/2017  :18 PM

## 2017-02-02 NOTE — PROGRESS NOTES
"Spoke with daughter, Monica, who reports that pt goes to day care twice per week. She refuses to attend more than 2 days.  Monica is worried that pt will eventually commit suicide as she has tried so many times and does not think that she can continue to care for her. Monica would like for pt to have cognitive testing as she has noticed a memory decline as has her providers at the day care center. Monica reports that she believes pt is suicidal because she discussed assisted living with pt this past weekend and pt believes that Monica is trying to \"kick her out\".    "

## 2017-02-02 NOTE — PLAN OF CARE
"Problem: Depressive Symptoms  Goal: Depressive Symptoms  Pt will remain safe without any self harm during hospitalization.  Patient will have decreased thoughts of self harm and/or suicidal ideation  Patient will report improved sleep and feeling rested upon waking.  Patient will have adequate daily oral intake.   Patient will have improvement in self-care, including personal hygiene.   Patient will verbalize and demonstrate an ability to cope for their age.   Patient will be able to participate and initiate activities and conversation with others.   Patient will discuss feelings of hopelessness and and express an interest in the future.   By discharge the patient will report an increase in sense of control over their current situation.(i.e by verbalizing coping techniques to help get their life back on track and/or naming people they can talk to when they need emotional assistance).  Outcome: Therapy, progress towards functional goals is fair  Patient is alert and fully oriented to person, place, time. Stated mood is \"ok\", frustrated. Affect restricted, but brightens and engages with staff interaction. Denies current SI or self harm ideation. Appetite intact--ate 50% of breakfast and 75% of lunch. Taking in adequate fluids with meals and drinking water between meals. BG today 98 and 190 (prior to receiving any insulin for the day). Seen by internal medicine and insulin orders adjusted from PTA med list. Daughter here this morning and met with  and internal medicine. Patient signed waiver for Amharic  (with  present). Patient agreed to sign in voluntary, plan to d/c 72 hold. She has been getting around unit using wheelchair without assistance and transfers with one SBA. On fall precautions due to right AKA. Patient talked extensively about frustrations related to feeling like she is being abandoned by her kids after providing for them and caring for them. Also frustrated she does " not have enough money to do more for herself. See  note for more information on this.

## 2017-02-02 NOTE — PLAN OF CARE
Problem: General Plan of Care (Inpatient Behavioral)  Goal: Individualization/Patient Specific Goal (IP Behavioral)  The patient and/or their representative will achieve their patient-specific goals related to the plan of care.    The patient-specific goals include:   Illness Management Recovery model: Objectives    Patient will identify reason(s) for hospitalization from their perspective.  Patient will identify a minimum of three goals for discharge.  Patient will identify a minimum of three triggers that may increase their symptoms.  Patient will identify a minimum of three coping skills they can do to stay well.   Patient will identify their support system to demonstrate readiness for discharge.  Reasons you are in the hospital:  1)  Upset at all kinds of stuff  2)  Upset with daughter  3)  Depression    Goals for Discharge:  1)  Take care of me

## 2017-02-02 NOTE — PROGRESS NOTES
"Attended 1 of 2 OT groups. She was pleasant, social, initiated comments and elaborated extensively on them. She stated she is unable to fill out OT goal form, \"I speak English well but I don't read it\". She talked about her children, her experiences at the Day Program she attends. She chose and worked on a familiar step task at a constant pace. She chose the miss the afternoon OT group stating feeling \"too tired\". Will discuss goals more specifically with pt. She was oriented to OT group purpose. Plan:  Encourage attendance. Offer opportunity for success oriented, more structured task work, grade complexity as needed, provide the opportunity to channel energy productively with attendance and reassurance with task focus. Assess organization and problem solving and document.    "

## 2017-02-02 NOTE — PLAN OF CARE
Problem: Suicide Risk (Adult)  Goal: Identify Related Risk Factors and Signs and Symptoms  Related risk factors and signs and symptoms are identified upon initiation of Human Response Clinical Practice Guideline (CPG)     Patient is a 76 year old [place]'s female admitted for suicide ideation with a plan to overdose on her medications. Patient has a history of previous suicide attempt in 2011 with a plan to drive her motorize wheel-chair into the traffic. Per report, patient made suicide comment to her daughter because of discussion about patient moving into an assisted living. Patient is on a 72 hour hold that was started at [facility] today at 1340. Patient will need a [place] .  Patient has several medical problems, including insulin-dependent diabetes and coronary artery disease. Her blood glucose was 43 while in the ED and had IV Dextrose-blood glucose has since been running in the 200's per report. She is above the knee amputation-right leg and uses electric wheel chair at home.   She was cooperative with admission and she denies SI/SIB. She was hyper verbal and rambles and will require Moz[place]ican - ordered for tomorrow(0800 to 1000). Patient will be stand-by assist with a wheel chair.    Patient gave permission to speak with her daughter who went over her home medications with me. Per daughter, She is on 56 unit of Lantus BID which is above maximum dose in EPIC. The Humalog order states to take 28 unit for small meal and 30 units for large meal.  Internal medicine ordered for tomorrow morning to clarify and order patient's insulin.

## 2017-02-02 NOTE — PROGRESS NOTES
"   02/02/17 1533   Visit Information   Visit Made By Chaplain Resident   Type of Visit Initial   Visited Patient   Interventions   Basic Spiritual Interventions   Assessment of spiritual needs/resources;Reflective conversation;Life Review   Advanced Assessments/Interventions   Presenting Concerns/Issues Spiritual/Confucianist/emotional support;Grief and adjustment issues;Goals of care discernment;Social isolation   SPIRITUAL HEALTH SERVICES   Spiritual Assessment Progress Note (Behavioral Health/CD Focus)  [facility] 3BW    REFERRAL SOURCE: patient requested a hospital  on admission.     On this visit, Ramón shared a reflective conversation regarding her hospitalization, and future plans for care.     EXPERIENCE OF ILLNESS/HOSPITALIZATION:  Ramón shared her concern for moving into an assisted living facility centers on fear of abandonment by her children after she moves in.  Ramón moved to this [place] she  an [place] and has not been back. She feels \"cut off\" from her extended family so her children are who she relates to as a support net. She shared she enjoys routine and familiarity with her usual routines so changing those would be hard.    MEANING-MAKING:  \"I love life, I don't want to die, but I don't know how much life is left in me.\" Ramón shared that she prays a thank you to God when she wakes up in the morning and that it is a gift, but that her health, particularly her heart, is in \"bad shape.\"     SPIRITUALITY/VALUES/Yazdanism:  Ramón prays often.     COPING/SPIRITUAL PRACTICES: Prayer. To cope with her amputated leg, Ramón likes having a small blanket or towel in her pant-leg to act as a visual prosthesis, this helps her to feel \"more like myself.\"     SUPPORT SYSTEMS: Ramón shared her family is her support system.     PLAN: I will follow up with Ramón 1x/week as she remains on the unit.                                                                                                                "                                 Maryuri Gonzalez  [facility] Resident  Pager [phone]

## 2017-02-02 NOTE — ED NOTES
CALLED AND UPDATED NURSE ON 3B THAT PT URINE SMELLED MALODOROUS AND WE WERE NOT ABLE TO GET A URINE SAMPLE, INFORMED HIM THAT SHE TRANSFERS WELL WITH [facility] AND THAT SHE WAS ON THE WAY.

## 2017-02-02 NOTE — CONSULTS
"  Internal Medicine Initial Visit    Ramón Galarza MRN# [id number]   Age: 76 year old YOB: 1940   Date of Admission: 2/1/2017    ADMIT DATE: 2/1/2017  DATE OF CONSULT: 2/2/2017    PCP: Lashaun Valenzuela    REQUESTING SERVICE: Psychiatry  REASON FOR CONSULT: CAD, Afib, SSS, HTN    CHIEF COMPLAINT: Depression    HPI: Ramón Galarza is a 76 year old female with a past medical history of HTN, HLD, DM2, PAD s/p right AKA, moderate right ICA stenosis, SSS s/p pacemaker, CAD s/p PCI, CABG in 2015, ischemic cardiomyopathy (ECHO in 08/2015 moderate-severe apical wall hypokinesis, EF of 45-50%, mild mitral regurgitation), CKD who is admitted to station 3B for depression w/ suicidal ideation.     The patient notes from a medical standpoint she is doing well. She denies any pain w/ exception to phantom limb pain every once in awhile. She has no edema. Normal bowels and bladder.     She notes that she feels depressed because her daughter brought up moving to an assisted living. She has 3 kids, 1 daughter in the area who she lives with. 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 She notes she is sad that no one wants to take care of her and that they want to \"kick me out\". She notes overall everyone loves her and she is much liked. She likes her day program and the staff there.    The daughter notes memory issues for a while now. She notes she sometimes forgets her medications and doesn't take them at the appropriate time. Apparently the group home also contacted her recently to tell her that the patient is asking for help reading the insulin pen and that she isn't always sure if the number is right. Ricarda thinks she is taking her medications daily. The patient is a reliable historian with medications and insulin dosing. Ricarda notes optho did give her a prescription and noted she needed eyeglasses but the patient refused due to cost.     ROS:   10 point ROS asked and " otherwise negative, with exceptions as noted above in HPI.     PMH:  Past Medical History   Diagnosis Date     Coronary artery disease      NSTEMI 2015, CAB 2015     Diabetes mellitus (H)      Hypertension      PAD (peripheral artery disease) (H)      Hyperlipidaemia      Sick sinus syndrome (H) 2010     PPM     CHF (congestive heart failure) (H)      Suicidal thoughts      Pacemaker      Chronic kidney disease      Depressive disorder      Attempted suicide (H)        PSH:  Past Surgical History   Procedure Laterality Date     Amputation       R AKA     Implant pacemaker  2010     PPM     Bypass graft femoropopliteal       Failed---amputation     Angiogram  5/2015     Bypass graft artery coronary N/A 5/19/2015     LIMA-LAD, SVG-D1     Stent       Left SFA       MEDICATIONS    Current Facility-Administered Medications on File Prior to Encounter:  [COMPLETED] dextrose 50 % injection 50 mL   [COMPLETED] dextrose 5% and 0.45% NaCl infusion   [DISCONTINUED] 0.9% sodium chloride BOLUS   [DISCONTINUED] LORazepam (ATIVAN) injection 0.5 mg     Current Outpatient Prescriptions on File Prior to Encounter:  Ezetimibe (ZETIA PO) Take 10 mg by mouth   Gemfibrozil (LOPID PO) Take 600 mg by mouth 2 times daily (before meals)   Isosorbide Mononitrate (IMDUR PO) Take 30 mg by mouth daily   carvedilol (COREG) 12.5 MG tablet Take 1 tablet (12.5 mg) by mouth 2 times daily (with meals) Hold if HR <55 or SBP < 100 and call NP   fenofibrate 160 MG tablet Take 1 tablet (160 mg) by mouth daily   clopidogrel (PLAVIX) 75 MG tablet Take 1 tablet (75 mg) by mouth daily   bumetanide (BUMEX) 0.5 MG tablet Take 1 tablet (0.5 mg) by mouth 2 times daily Give 0.5mg two times a day for CONGESTIVE HEART FAILURE. Hold if SBP <90 and call NP.   simvastatin (ZOCOR) 40 MG tablet Take 1 tablet (40 mg) by mouth At Bedtime   spironolactone (ALDACTONE) 25 MG tablet Take 0.5 tablets (12.5 mg) by mouth daily   insulin lispro (HUMALOG PEN) 100 UNIT/ML soln Inject  Subcutaneous 3 times daily (before meals)   insulin glargine (LANTUS) 100 UNIT/ML PEN Inject 56 Units Subcutaneous 2 times daily   digoxin (LANOXIN) 125 MCG tablet Take 1 tablet (125 mcg) by mouth daily Hold If HR <55 and notify NP   Acetaminophen (TYLENOL PO) Take 650 mg by mouth every 4 hours as needed for mild pain or fever   sertraline (ZOLOFT) 25 MG tablet Take 25 mg by mouth daily   aspirin 81 MG tablet Take 1 tablet by mouth daily.   gabapentin (NEURONTIN) 600 MG tablet Take 600 mg by mouth 3 times daily    Losartan Potassium (COZAAR PO) Take 50 mg by mouth       ALLERGIES:     Allergies   Allergen Reactions     Lisinopril Cough     Losartan Other (See Comments)     Violent shaking     Niacin      Vitamin D        FAMILY HISTORY:  Family History   Problem Relation Age of Onset     Unknown/Adopted No family hx of        SOCIAL HISTORY:  Social History     Social History     Marital Status:      Spouse Name: N/A     Number of Children: N/A     Years of Education: N/A     Social History Main Topics     Smoking status: Never Smoker      Smokeless tobacco: Never Used     Alcohol Use: No     Drug Use: No     Sexual Activity: No     Other Topics Concern     Caffeine Concern Yes     2 daily     Sleep Concern No     Special Diet No     Social History Narrative       PHYSICAL EXAM:  Blood pressure 149/41, pulse 60, temperature 96.5  F (35.8  C), temperature source Tympanic, resp. rate 16, height 1.524 m (5'), weight 51.71 kg (114 lb), SpO2 99 %.    GENERAL: Alert and orientated x 3. Appears in no acute distress. In wheelchair.   HEENT: Anicteric sclera. Mucous membranes moist and without lesions.   CV: RRR, S1S2, no murmurs appreciated.  RESPIRATORY: Respirations regular, even, and unlabored. Lungs CTAB with no wheezing, rales, rhonchi.   GI: Soft and non distended with normoactive bowel sounds present in all quadrants. No tenderness, rebound, guarding.   EXTREMITIES: No peripheral edema. Right AKA. 2+ bilateral  pedal pulses.   NEUROLOGIC: CN II-XII grossly intact. No focal deficits.   MUSCULOSKELETAL: No joint swelling or tenderness.   SKIN: No jaundice. No rashes or lesions.     LABS:  CMP  Recent Labs  Lab 02/01/17  1300      POTASSIUM 4.2   CHLORIDE 106   CO2 25   ANIONGAP 10   *   BUN 57*   CR 1.38*   GFRESTIMATED 37*   GFRESTBLACK 45*   KARO 9.1     CBC  Recent Labs  Lab 02/01/17  1300   WBC 5.4   RBC 3.42*   HGB 10.6*   HCT 31.7*   MCV 93   MCH 31.0   MCHC 33.4   RDW 13.0        INR  Recent Labs  Lab 02/01/17  1300   INR 1.06       IMAGING: None    ASSESSMENT & RECOMMENDATIONS:  #Depression, SI: Worsening due to chronic illness, daughter needing more help at home.  -Management per primary team, psychiatry  -Consider PT for home safety eval, OT for cognitive eval to aid in proper placement  #HTN, ischemic cardiomyopathy, CAD s/p PCI, CABG x2  in 2015, SSS s/p PPM: LIMA to LAD, SVT to diagonal. Follows with Dr. Baldwin of [facility], last visit 11/14/2016. ECHO in 08/2015 moderate-severe apical wall hypokinesis, EF of 45-50%, mild mitral regurgitation. EKG A paced at 60 BPM. Denies chest pain, edema, dyspnea.  -Continue aspirin, coreg 12.5 mg BID, simvastatin and Tricor  -Continue bumex for now (staff message out to Dr. Baldwin and ASHLEY to discuss continuation)  -Continue dig for now (staff message out to Dr. Baldwin and ASHLEY to discuss continuation)  -FU with cardiology as scheduled 3/22/2017 @ 9AM  -Is/Os, daily weights (Patient care order to notify medicine if weight gain of >2 pounds in 1 day or >5 pounds in 1 week)  Wt Readings from Last 3 Encounters:   02/01/17 51.71 kg (114 lb)   11/14/16 51.03 kg (112 lb 8 oz)   06/21/16 51.256 kg (113 lb)   #DM2 w/ nephropathy: BG in 200s last 18 hours (did have low of 43 at [facility]). PTA maintained on Lantus 56 units BID, Humalog 28 units for smaller meals, 30 units for larger meals per Endocrine note 09/21/2016. A1C 7.9 on 09/21. BG 98 this AM which  indicated good glycemic control w/ lantus regimen. Patient notes last insulin was 62 units of lantus in the AM yesterday, 30 units of novolog w/ breakfast & lunch. Per daughter, patient has needed verification for insulin per day center staff. TDD PTA is 214 units qday. Correction factor is 1 per 8 per rule of 1700, Carb counting is 1 per 2.4 G of carbs based on rule of 500.   -Lantus 45 units now and then qam  -Medium insulin resistance SSI  -Carb coverage w/ 1 unit/15G of carbs today  -TID AC, qhs, 0200 BG checks  -Hypoglycemia protocol  -Follow up with endocrinology as scheduled on 03/22/3017  #CKD stage 3: Creatinine of 1.38 on admission, baseline creat ~1.25 since 06/2016. BUN/creat ratio is 41, GFR of 37 which is baseline. Likely due to longstanding DM2 and HTN.  -Continue to monitor as OP  #HLD: On statin and fenofibrate PTA. Lipid panel from 2/2016 w/ Tchol of 283, TG of 785, HDL 27.   -Continue statin and fenofibrate  #PAD s/p right AKA: On aspirin PTA. Continue.   #Abnormal TFTs: TSH elevated to 5.62, free T4 on upper limit of abnormal at 0.74.   -Will hold off on starting medications  -Needs follow up with PCP in 4 weeks for recheck  #Normocytic anemia: Hemoglobin of 10.6 w/ normal MCV. Baseline since 2015.  -Consider iron studies and further work up as OP    I appreciate the opportunity to participate in the care of this patient. Medicine will continue to follow BG, cardiology input. Please notify on call ASHLEY if any intercurrent medical issues arise.     Karen Singh PA-C

## 2017-02-02 NOTE — PLAN OF CARE
Problem: General Plan of Care (Inpatient Behavioral)  Goal: Team Discussion  Team Plan:   BEHAVIORAL TEAM DISCUSSION    Continued Stay Criteria/Rationale: suicidal ideation  Plan:Psychiatric assessment, therapeutic milieu, medication management, individual and group support  Participants: Steve Banks NP, Hannah Connolly [facility], Monica Rose, HAYDE  Summary/Recommendation:The plan is to assess the patient for mental health and medication needs.    The patient will be prescribed medications to treat the identified symptoms.  Upon discharge the patient will be  referred to services as appropriate based on the assessment.  Medical/Physical:See medical notes  Progress: Initial

## 2017-02-02 NOTE — PROGRESS NOTES
Initial Psychosocial Assessment    I have reviewed the chart, met with the patient, and developed Care Plan. Information for assessment was obtained from: Chart review and interview with Pt.        Presenting Problem:  From Chart  Patient is a 76 year old Bengali's female admitted for suicide ideation with a plan to overdose on her medications. Patient has a history of previous suicide attempt in 2011 with a plan to drive her motorize wheel-chair into the traffic. Per report, patient made suicide comment to her daughter because of discussion about patient moving into an assisted living. Patient is on a 72 hour hold that was started at [facility] today at 1340. Patient will need a Bengali .  Patient has several medical problems, including insulin-dependent diabetes and coronary artery disease. Her blood glucose was 43 while in the ED and had IV Dextrose-blood glucose has since been running in the 200's per report. She is above the knee amputation-right leg and uses electric wheel chair at home.   She was cooperative with admission and she denies SI/SIB. She was hyper verbal and rambles and will require Bengali - ordered for tomorrow(0800 to 1000). Patient will be stand-by assist with a wheel chair.    Patient gave permission to speak with her daughter who went over her home medications with me. Per daughter, She is on 56 unit of Lantus BID which is above maximum dose in EPIC. The Humalog order states to take 28 unit for small meal and 30 units for large meal.  Internal medicine ordered for tomorrow morning to clarify and order patient's insulin.    History of Mental Health and Chemical Dependency:  Pt has a history of mental health issues including depression.  She was last hospitalized at [facility] in 8/2011. She attends an adult day care program two days per week at [facility].  She has been in this country for 47 years.    Family Description ([place], Family  Psychiatric History):  Pt has two daughters, a son-in-law and grandkids.    Significant Life Events (Illness, Abuse, Trauma, Death):  Unable to assess.    Living Situation:  Patient lives with her daughter Monica.    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    Financial Status:  Receives social security of $1128 per month.    Legal Issues:  Denies    Ethnic/Cultural Issues:  None identified    Spiritual Orientation:  Pt used to be active as a Jehovah Witness.     Service History:  None    Social Functioning (organization, interests):  Pt could not think of any activities she enjoys..       Current Treatment Providers are:  Psychiatrist:  None    Therapist:  None    :  Lydia Dinero  [facility]  [phone]    Social Service Assessment/Plan:  Pt was calm and pleasant during the assessment.  She was focused on her children and how she has provided for them and how they should be providing for her now.  She was quite animated.  Hospital staff will provide a safe environment and a therapeutic milieu. Pt will have psychiatric assessment and medication management by the psychiatrist. CTC will do individual inpatient treatment planning and after care planning. Staff will continue to assess pt as needed. Patient will participate in unit groups and activities. Pt will receive individual and group support on the unit.    Patient s individual goal for discharge is:  1) Take care of me    Patient admitted for safety/stabilization of mood disorder sx's.  Medication will be reviewed, adjusted per MD's as indicated.    Will contact outpatient providers for care coordination.  Will discuss options for increased community supports.  Will continue to assess, coordinate care, and ensure appropriate f/u care is in place.

## 2017-02-02 NOTE — PROGRESS NOTES
Spoke with pt's CM, Lydia Dinero at [phone], who has concerns about the need for 24 hour care for pt. She also has concerns that pt is saving up her medication to overdose. She has been referred to counseling but has not followed through. Lydia has questions about what an appropriate discharge plan would be.  She also wonders if pt's provider would support a statement to initiate guardianship.  This writer will speak with pt's provider.

## 2017-02-03 NOTE — CONSULTS
[facility]  OCCUPATIONAL THERAPY:  MoCA  (English and Bruneian)  Patient Name:    Ramón Galarza  Date of Assessment: 2-3-2017     Time of day:   early afternoon accompanied by Bruneian     THE RESULTS OF THIS ASSESSMENT PROVIDE RECOMMENDATIONS BASED UPON FUNCTION AT THE TIME OF ADMINISTRATION ONLY, MAY NOT REFLECT BASELINE FUNCTION.    .THANG COGNITIVE ASSESSMENT (MoCA)    The Thang Cognitive Assessment (MoCA) was designed as a rapid screening instrument for mild cognitive dysfunction. It assesses different cognitive domains: attention and concentration, executive functions, memory, language, visuoconstructional skills, conceptual thinking, calculations, and orientation. The total possible score is 30 points; a score of 26 or above is considered normal.    THANG COGNITIVE ASSESSMENT SCORE:   11 / 30      Draw a Clock Subtest: Score:    1  / 3    Details if points were missed:  numbers were in the incorrect quadrant and time was not set correctly.    Details:   Scored questions were as follows: 1 /5 in Visuospatial/Executive, 2 /3 in Naming, (Bruneian animal names were used as she missed the English names) 1 /6 in Attention, 0 /3 in Language, 0 /2 in Abstraction,  2 /5 in Delayed Recall,  5  /6 in Orientation (thought it was January and she repeated 3 this 3X.     ASSESSMENT/RECOMMENDATIONS     Ramón was pleasant and cooperative throughout this session. When she seemed to have difficulty understanding the directions, or made errors in answers, the  gave instructions also in Bruneian. Ramón spoke in Bruneian at times and explained her concerns about family and the difficulty of moving from her children. She did however, state with smiling, she is in agreement to move to an Assisted Living with understanding the advantages of living with peers, having meals provided, and activities available.     Ramón scored on the MoCA 11 which is below the  normal range. A combination of English and Romansh was used to ensure a better understanding and hopefully a more reliable score.     Recommend an Assisted Living setting, all meals provided, medications administered, support offered with active involvement in activities and time spent with peers. This seems to be an important motive and comfort for her knowing she would be around peers and staff also as needed.

## 2017-02-03 NOTE — PROGRESS NOTES
0800 - NovoLog 4 units SS given  1015   1230 - NovoLog 6 units SS given  1430 - no insulin given as pt denies snacks- pt was meeting with staff at that time.     Pt reports feeling sad now and not caring about diabetes, occurred after talk with daughter and agreeing to move to [facility]. Family mtg pending Monday at 1100
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
## 2017-02-03 NOTE — PROGRESS NOTES
Care conference scheduled for Monday at 11:00 am to include pt's CM Lydia Dinero and pt's daughter, Ricarda.

## 2017-02-03 NOTE — PROGRESS NOTES
"   02/03/17 1300   General Information   Special Considerations completed on 2-3   Clinical Impression   Affect Flat   Orientation Needs further assessment   Appearance and ADLs General cleanliness observed in most areas   Attention to Internal Stimuli No observed signs   Interaction Skills Initiates appropriately with staff;Initiates appropriately with peers   Ability to Communicate Needs Independent   Verbal Content Clear;[place];Tangential;Other (see comments)  (talked about family on several occasions.)   Ability to Maintain Boundaries Maintains appropriate physical boundaries;Maintains appropriate verbal boundaries   Participation Initiates participation   Concentration Concentrates 20-30 minutes   Ability to Concentrate With structure   Follows and Comprehends Directions Independently follows 1 step verbal directions   Memory Needs further assessment   Organization Independently organizes simple tasks;Needs occasional assistance    Decision Making Independent   Planning and Problem Solving Occasionally needs assist/feedback   Ability to Apply and Learn Concepts Applies within group structure   Frustrations / Stress Tolerance Indirect responses to frustration   Level of Insight Needs further assessment   Self Esteem Can identify positives   Social Supports Has knowledge of support systems   General Observation/Plan   General Observations/Plan See Comments   Attended 1 of 2 OT groups. She worked independently on 1 step task after supplies were provided and gathered for her. She stated feeling \"sad\" with changes needing to move away to an AL. Pt stated she is unable to fill out the OT goal form \"I can't read English, only speak it\". She is pleasant with others, tone of voice seemed more calm today. See goals stated on 2-2.  "

## 2017-02-03 NOTE — PLAN OF CARE
Problem: General Plan of Care (Inpatient Behavioral)  Goal: Team Discussion  Team Plan:   BEHAVIORAL TEAM DISCUSSION    Continued Stay Criteria/Rationale: suicidal ideation  Plan:Psychiatric assessment, therapeutic milieu, medication management, individual and group support  Participants: Steve Banks NP, Keysha Ruth, RN, Hannah Connolly [facility], Monica Rose, OT  Summary/Recommendation: A care conference is scheduled for Monday to discuss discharge planning. Pt's  and daughter will attend. Plans for assisted living will be discussed.  Medical/Physical:See medical notes  Progress:Ongoing

## 2017-02-03 NOTE — PROGRESS NOTES
Facilitated a group on self esteem and discussed ways that self esteem impact daily living.  We also discussed strategies to improve self esteem. Handouts were provided and discussion initiated. Pt left group alf through and did not return.  She reports that she has good self esteem.

## 2017-02-03 NOTE — PROGRESS NOTES
*Accompanying written note in separate progress note.       02/03/17 1300   Behavioral Health   Hallucinations denies / not responding to hallucinations   Thinking distractable   Orientation person: oriented;place: oriented   Insight poor   Eye Contact out of corner of eyes;at examiner   Affect sad   Mood depressed;anxious   Physical Appearance/Attire attire appropriate to age and situation   Hygiene neglected grooming - unclean body, hair, teeth   Suicidality other (see comments)  (Told daughter she wanted to kill self before [place])   Self Injury other (see comment)  (None stated)   Activity other (see comment)  (Will be active in milieu, but sometimes withdraws/isolates)   Speech pressured;rambling;tangential;other (see comments)  (Language barrier)   Medication Sensitivity no stated side effects   Psychomotor / Gait unsteady  (In wheelchair due to amputated leg, can transition self)   Safety   Suicidality status 15   Fall fall (yellow) wristband   Psycho Education   Type of Intervention 1:1 intervention   Response participates, initiates socially appropriate   Hours 0.5   Treatment Detail (1:1 Check-in)   Activities of Daily Living   Hygiene/Grooming with assistance   Oral Hygiene with assistance   Dress with assistance   Laundry unable to complete   Room Organization independent   Activity   Activity Level of Assistance independent   Behavioral Health Interventions   Depression maintain safety precautions;maintain safe secure environment;assist patient in developing safety plan;assist patient in following safety plan;provide emotional support;establish therapeutic relationship;assist with developing and utilizing healthy coping strategies;build upon strengths   Social and Therapeutic Interventions (Depression) encourage socialization with peers;encourage participation in therapeutic groups and milieu activities

## 2017-02-03 NOTE — PROGRESS NOTES
Brief medicine note:    Followed blood glucose levels. Note evening levels quite high (314 at 1927)    Had discussed with endocrine team earlier today. Will continue current SSI and plan to give 10 units of lantus this evening if 2200 BG >250 (note home requirements are 62 units of lantus BID). Will need BG checked at 0200 and then again in AM (or if any s/s of hypoglycemia).     LIANNE Deras

## 2017-02-03 NOTE — PROGRESS NOTES
Spoke with pt's daughter, Ricarda, who was angry about what was said to her mom about assisted living as pt was angry with daughter because she feels that daughter no longer wants her living with her as she is tired of her.  This writer informed her that pt likely misinterpreted what was said to her. Cafe conference set for Monday.

## 2017-02-03 NOTE — PROGRESS NOTES
Brief medicine note:    Following BG levels. Added 10 units of lantus last evening. AM BG was 147.     #DM2 w/ nephropathy: BG in 200s last 18 hours (300s 2 hours post meal, did have low of 43 at [facility] on 02/01). PTA maintained on Lantus 62 units BID, Fixed mealtime Humalog-28 units for smaller meals, 30 units for larger meals per Endocrine note 09/21/2016- verified with patient and daughter. A1C 7.9 on 09/21. BG 98 02/02 AM which indicated good glycemic control w/ lantus regimen (Patient had noted last insulin was 62 units of lantus in the AM 02/01, 30 units of novolog w/ breakfast & lunch). Per daughter, patient has needed verification for insulin per day center staff. TDD PTA is 214 units qday- based on this her correction factor is 1 per 8 per rule of 1700, carb counting is 1 per 2.4 G of carbs based on rule of 500 (much different than weight based requirements).    -Endocrine consult placed due to high insulin requirements   -Lantus 45 units now and then qam  -Will schedule 15 units of lantus qhs  -Increase to high insulin resistance SSI  -Carb coverage w/ 1 unit/15G of carbs today- consider increasing tomorrow pending trend  -TID AC, qhs, 0200, 0500 BG checks  -Hypoglycemia protocol  -Follow up with endocrinology as scheduled on 03/22/3017    LIANNE Deras

## 2017-02-03 NOTE — PROGRESS NOTES
[facility], [place]   Psychiatric Progress Note        Interim History:   The patient's care was discussed with the treatment team during the daily team meeting and/or staff's chart notes were reviewed.  Staff report patient calm, pleasant, social with  Peers and staff. She attended all groups. Her mood was good, affect bright. She was eating well and taking her medications. She shared that she feels abandoned by her children which is causing a great sorrow. She slept 6.5 hours last night. Patient is independent with most of her ADL's, needs help to transfer only.     Met with patient in her room. Rates depression as minimal, states she is sad about her home situation but not really depressed. Denies anxiety, hallucinations, delusions. Denies SI/SIB/HI. No manic symptoms were noted. Speech was normal rate and rhythm. Denies side effects of the medications. Discussed living options post discharge. She was tearful when talking about her expectation to be taking care of by her children, states in [place], older people stay with their children. Again, patient stated that she wants to go home to her daughter but was able to understand that her daughter has difficult time providing the care she needs. Patient agreed to move into an AL.   Family meeting with her daughter and the [facility]  was scheduled for Monday, 2/6/17 at 11:00.             Medications:       insulin aspart   Subcutaneous QAM AC     insulin aspart   Subcutaneous Daily with lunch     insulin aspart   Subcutaneous Daily with supper     insulin aspart  1-7 Units Subcutaneous TID AC     insulin aspart  1-5 Units Subcutaneous At Bedtime     insulin glargine  45 Units Subcutaneous QAM AC     sertraline  50 mg Oral Daily     aspirin EC  81 mg Oral Daily     bumetanide  0.5 mg Oral BID     carvedilol  12.5 mg Oral BID w/meals     digoxin  125 mcg Oral Daily     ezetimibe (ZETIA) tablet 10 mg  10 mg Oral Daily      fenofibrate  160 mg Oral Daily     gabapentin  600 mg Oral TID     gemfibrozil (LOPID) tablet 600 mg  600 mg Oral BID AC     spironolactone  12.5 mg Oral Daily     simvastatin  40 mg Oral At Bedtime     isosorbide mononitrate (IMDUR) 24 hr tablet 30 mg  30 mg Oral Daily          Allergies:     Allergies   Allergen Reactions     Lisinopril Cough     Losartan Other (See Comments)     Violent shaking     Niacin      Vitamin D           Labs:     Recent Results (from the past 672 hour(s))   Lipid Profile    Collection Time: 01/13/17  8:09 AM   Result Value Ref Range    Cholesterol 166 <200 mg/dL    Triglycerides 654 (H) <150 mg/dL    HDL Cholesterol 27 (L) >49 mg/dL    LDL Cholesterol Calculated  <100 mg/dL     Cannot estimate LDL when triglyceride exceeds 400 mg/dL   Desirable:       <100 mg/dl      Non HDL Cholesterol 139 (H) <130 mg/dL   ALT    Collection Time: 01/13/17  8:09 AM   Result Value Ref Range    ALT 5 5 - 30 U/L   Glucose by meter    Collection Time: 02/01/17 12:12 PM   Result Value Ref Range    Glucose 139 (H) 70 - 99 mg/dL   CBC with platelets differential    Collection Time: 02/01/17  1:00 PM   Result Value Ref Range    WBC 5.4 4.0 - 11.0 10e9/L    RBC Count 3.42 (L) 3.8 - 5.2 10e12/L    Hemoglobin 10.6 (L) 11.7 - 15.7 g/dL    Hematocrit 31.7 (L) 35.0 - 47.0 %    MCV 93 78 - 100 fl    MCH 31.0 26.5 - 33.0 pg    MCHC 33.4 31.5 - 36.5 g/dL    RDW 13.0 10.0 - 15.0 %    Platelet Count 190 150 - 450 10e9/L    Diff Method Automated Method     % Neutrophils 50.6 %    % Lymphocytes 37.0 %    % Monocytes 8.5 %    % Eosinophils 3.3 %    % Basophils 0.4 %    % Immature Granulocytes 0.2 %    Nucleated RBCs 0 0 /100    Absolute Neutrophil 2.7 1.6 - 8.3 10e9/L    Absolute Lymphocytes 2.0 0.8 - 5.3 10e9/L    Absolute Monocytes 0.5 0.0 - 1.3 10e9/L    Absolute Eosinophils 0.2 0.0 - 0.7 10e9/L    Absolute Basophils 0.0 0.0 - 0.2 10e9/L    Abs Immature Granulocytes 0.0 0 - 0.4 10e9/L    Absolute Nucleated RBC 0.0     Basic metabolic panel    Collection Time: 02/01/17  1:00 PM   Result Value Ref Range    Sodium 141 133 - 144 mmol/L    Potassium 4.2 3.4 - 5.3 mmol/L    Chloride 106 94 - 109 mmol/L    Carbon Dioxide 25 20 - 32 mmol/L    Anion Gap 10 3 - 14 mmol/L    Glucose 103 (H) 70 - 99 mg/dL    Urea Nitrogen 57 (H) 7 - 30 mg/dL    Creatinine 1.38 (H) 0.52 - 1.04 mg/dL    GFR Estimate 37 (L) >60 mL/min/1.7m2    GFR Estimate If Black 45 (L) >60 mL/min/1.7m2    Calcium 9.1 8.5 - 10.1 mg/dL   INR    Collection Time: 02/01/17  1:00 PM   Result Value Ref Range    INR 1.06 0.86 - 1.14   Digoxin level    Collection Time: 02/01/17  1:00 PM   Result Value Ref Range    Digoxin Level 1.9 0.5 - 2.0 ug/L   TSH with free T4 reflex    Collection Time: 02/01/17  1:00 PM   Result Value Ref Range    TSH 5.62 (H) 0.40 - 4.00 mU/L   T4 free    Collection Time: 02/01/17  1:00 PM   Result Value Ref Range    T4 Free 0.74 (L) 0.76 - 1.46 ng/dL   EKG 12-lead, tracing only    Collection Time: 02/01/17  1:09 PM   Result Value Ref Range    Interpretation ECG Click View Image link to view waveform and result    Glucose by meter    Collection Time: 02/01/17  3:48 PM   Result Value Ref Range    Glucose 43 (LL) 70 - 99 mg/dL   Glucose by meter    Collection Time: 02/01/17  4:26 PM   Result Value Ref Range    Glucose 227 (H) 70 - 99 mg/dL   Glucose by meter    Collection Time: 02/01/17  5:33 PM   Result Value Ref Range    Glucose 231 (H) 70 - 99 mg/dL   Glucose by meter    Collection Time: 02/01/17  9:32 PM   Result Value Ref Range    Glucose 202 (H) 70 - 99 mg/dL   Glucose by meter    Collection Time: 02/02/17  2:05 AM   Result Value Ref Range    Glucose 283 (H) 70 - 99 mg/dL   Glucose by meter    Collection Time: 02/02/17  8:25 AM   Result Value Ref Range    Glucose 98 70 - 99 mg/dL   Hemoglobin A1c    Collection Time: 02/02/17 10:50 AM   Result Value Ref Range    Hemoglobin A1C 8.1 (H) 4.3 - 6.0 %   Glucose by meter    Collection Time: 02/02/17 11:46 AM    Result Value Ref Range    Glucose 190 (H) 70 - 99 mg/dL   Glucose by meter    Collection Time: 02/02/17  4:58 PM   Result Value Ref Range    Glucose 364 (H) 70 - 99 mg/dL   Glucose by meter    Collection Time: 02/02/17  7:27 PM   Result Value Ref Range    Glucose 314 (H) 70 - 99 mg/dL   Glucose by meter    Collection Time: 02/02/17  9:39 PM   Result Value Ref Range    Glucose 293 (H) 70 - 99 mg/dL   Glucose by meter    Collection Time: 02/03/17  2:04 AM   Result Value Ref Range    Glucose 263 (H) 70 - 99 mg/dL   Glucose by meter    Collection Time: 02/03/17  5:07 AM   Result Value Ref Range    Glucose 176 (H) 70 - 99 mg/dL   Glucose by meter    Collection Time: 02/03/17  8:03 AM   Result Value Ref Range    Glucose 147 (H) 70 - 99 mg/dL   Glucose by meter    Collection Time: 02/03/17 10:14 AM   Result Value Ref Range    Glucose 211 (H) 70 - 99 mg/dL            Psychiatric Examination:   Temp: 97.8  F (36.6  C) Temp src: Tympanic BP: 157/40 mmHg Pulse: 61   Resp: 16        Weight is 114 lbs 0 oz  Body mass index is 22.26 kg/(m^2).    Appearance: awake, alert  Attitude:  cooperative  Eye Contact:  good  Mood:  sad   Affect:  appropriate and in normal range  Speech:  clear, coherent  Psychomotor Behavior:  no evidence of tardive dyskinesia, dystonia, or tics  Throught Process:  disorganized, circumstantial and concrete  Associations:  no loose associations  Thought Content:  no evidence of suicidal ideation or homicidal ideation  Insight:  partial  Judgement:  limited  Oriented to:  time, person, and place  Attention Span and Concentration:  limited  Recent and Remote Memory:  limited         Precautions:     Behavioral Orders   Procedures     Code 1 - Restrict to Unit     Fall precautions     Routine Programming     As clinically indicated     Self Injury Precaution     Single Room     Status 15     Every 15 minutes.     Suicide precautions          DIagnoses:     1. Major Depressive Disorder , recurrent,  moderate  2. DMII         Plan:   1. Continue Zoloft 50 mg qam.  2. Continue prn Hydroxyzine for anxiety, Trazodone for sleep, and Zyprexa for agitation  3. Family Meeting on Monday, 2/6/2017 with daughter Monica and chato JENSEN  4. Internal Medicine to address physical illness  5. Care was coordinated wit the treatment team    Sanjay HARRIS DNP  Date: 02/03/2017  Time: 12:37 PM

## 2017-02-03 NOTE — PROGRESS NOTES
Spoke with Norma from [facility] at [phone] to determine if they have done any cognitive testing with pt.  She reports that they have not. Norma reports that pt sometimes forgets taking her medications when she should and asks them questions about her insulin dose which is a concern for them. Norma reports that pt presents as cheery and outgoing when she is at .

## 2017-02-04 NOTE — CONSULTS
"Diabetes/Hyperglycemia Management Consult    Reason for consult  DMT2 requiring very high doses of insulin per home regimen  Consult requested by: Karen Singh PA-C, internal medicine  History of Present Illness Ramón Galarza is a 76 year old female with a past medical history of HTN, HLD, DM2, PAD s/p right AKA, moderate right ICA stenosis, SSS s/p pacemaker, CAD s/p PCI, CABG in 2015, ischemic cardiomyopathy (ECHO in 08/2015 moderate-severe apical wall hypokinesis, EF of 45-50%, mild mitral regurgitation), CKD who is admitted to station 3B for depression w/ suicidal ideation.     . She denies any pain w/ exception to phantom limb pain every once in awhile. She has no edema. Normal bowels and bladder.     She states she feels depressed because her daughter brought up moving to an assisted living. She has 3 kids, 1 daughter in the area who she lives with. 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 She notes she is sad that no one wants to take care of her and that they want to \"kick me out\". She notes overall everyone loves her and she is much liked. She likes her day program and the staff there.    Noted in internal medicine consult that The daughter has noticed memory issues for a while now. States Ramón sometimes forgets her medications and doesn't take them at the appropriate time. Apparently the group home also contacted her recently to tell her that the patient is asking for help reading the insulin pen and that she isn't always sure if the number is right. Daughter thinks she is taking her medications daily. The patient is a reliable historian with medications and insulin dosing. Ricarda notes optho did give her a prescription and noted she needed eyeglasses but the patient refused due to cost. Ramón validated the above with this provider    PTA RX Lantus 56 units BID, Humalog 28 units for smaller meals, 30 units for larger meals per Endocrine note but " patient stating taking 62 Lantus BID. 09/21/2016. A1C 7.9 on 09/21 (but is anemic to 10.6)    Recent Labs  Lab 02/04/17  1204 02/04/17  0901 02/04/17  0503 02/04/17  0142 02/03/17  2120 02/03/17  1712  02/01/17  1300   GLC  --   --   --   --   --   --   --  103*   * 94 113* 168* 253* 329*  < >  --    < > = values in this interval not displayed.      Diabetes Type: 2  Diabetes Duration: 10 years  Usual Diabetes Regimen: lantus with fixed meal/correction of 30 units TID at meals  Ability to [place] Prescribed Regimen: fair-good  Diabetes Control: A1C      8.1   2/2/2017  A1C      8.2   2/23/2016  A1C      8.2   2/11/2016  A1C      7.3   6/9/2015  A1C      7.6   5/20/2015  Diabetes Complications: PVD, neuropathy, CVD, CKD  Right AKA  History of DKA: no  Able to Detect Hypoglycemia:  Yes @75  Usual Diabetes Care Provider:  Follow up with endocrinology 03/22/17  Factors Impacting Glucose Control: poor eyesight, current psychological status      Review of Systems  10 point ROS completed with pertinent positives and negatives noted in the HPI    Past medical, family and social histories are reviewed and updated.    Past Medical History  Past Medical History   Diagnosis Date     Coronary artery disease      NSTEMI 2015, CAB 2015     Diabetes mellitus (H)      Hypertension      PAD (peripheral artery disease) (H)      Hyperlipidaemia      Sick sinus syndrome (H) 2010     PPM     CHF (congestive heart failure) (H)      Suicidal thoughts      Pacemaker      Chronic kidney disease      Depressive disorder      Attempted suicide (H)        Family History  Family History   Problem Relation Age of Onset     Unknown/Adopted No family hx of        Social History  Social History     Social History     Marital Status:      Spouse Name: N/A     Number of Children: N/A     Years of Education: N/A     Social History Main Topics     Smoking status: Never Smoker      Smokeless tobacco: Never Used     Alcohol Use: No      Drug Use: No     Sexual Activity: No     Other Topics Concern     Caffeine Concern Yes     2 daily     Sleep Concern No     Special Diet No     Social History Narrative         Physical Exam  Temp: 99  F (37.2  C) Temp  Min: 97.2  F (36.2  C)  Max: 99  F (37.2  C)  Pulse: 60 Pulse  Min: 60  Max: 61  BP: (!) 141/36 mmHg   Systolic (24hrs), Av mmHg, Min:140 mmHg, Max:175 mmHg  Diastolic (24hrs), Av mmHg, Min:36 mmHg, Max:50 mmHg    General:  pleasant in WC on unit , in no distress.   HEENT: NC/AT, PER and anicteric, non-injected, oral mucous membranes moist. Poor dentition   Lungs: RRR respiration, no cough  ABD: soft, nondistended  Skin: warm and dry, hemosiderin staining lower left leg, small scabbed ulcers on left dorsal foot  MSK:  fluid movement of all extremities;Right AKA  Lymp:  no LE edema, denies pain, tingling, but has some numbness  Mental status:  alert, oriented x3, communicating clearly but with thick accent, answers questions logically  Psych: somewhat excitable, distressed by family wanting to put her in assisted living; otherwise relays information reasonably    Laboratory  Recent Labs   Lab Test  17   1300  16   0744   NA  141  137   POTASSIUM  4.2  4.7   CHLORIDE  106  99   CO2  25  32   ANIONGAP  10  6   GLC  103*  270*   BUN  57*  47*   CR  1.38*  1.18*   KARO  9.1  9.7     CBC RESULTS:   Recent Labs   Lab Test  17   1300   WBC  5.4   RBC  3.42*   HGB  10.6*   HCT  31.7*   MCV  93   MCH  31.0   MCHC  33.4   RDW  13.0   PLT  190       Liver Function Studies -   Recent Labs   Lab Test  17   0809   05/28/15   0600   PROTTOTAL   --    --   5.7*   ALBUMIN   --    --   3.2*   BILITOTAL   --    --   1.9*   ALKPHOS   --    --   127   AST   --    --   35   ALT  5   < >  44    < > = values in this interval not displayed.       Active Medications  Current Facility-Administered Medications   Medication     insulin glargine (LANTUS) injection 55 Units     insulin aspart  (NovoLOG) inj (RAPID ACTING)     insulin aspart (NovoLOG) inj (RAPID ACTING)     insulin aspart (NovoLOG) inj (RAPID ACTING)     insulin aspart (NovoLOG) inj (RAPID ACTING)     insulin aspart (NovoLOG) inj (RAPID ACTING)     insulin aspart (NovoLOG) inj (RAPID ACTING)     sertraline (ZOLOFT) tablet 50 mg     aspirin EC EC tablet 81 mg     bumetanide (BUMEX) tablet 0.5 mg     carvedilol (COREG) tablet 12.5 mg     digoxin (LANOXIN) tablet 125 mcg     ezetimibe (ZETIA) tablet 10 mg     fenofibrate tablet 160 mg     gabapentin (NEURONTIN) tablet 600 mg     gemfibrozil (LOPID) tablet 600 mg     spironolactone (ALDACTONE) half-tab 12.5 mg     simvastatin (ZOCOR) tablet 40 mg     acetaminophen (TYLENOL) tablet 650 mg     hydrOXYzine (ATARAX) tablet 25-50 mg     alum & mag hydroxide-simethicone (MYLANTA ES/MAALOX  ES) suspension 30 mL     magnesium hydroxide (MILK OF MAGNESIA) suspension 30 mL     traZODone (DESYREL) tablet 50 mg     glucose 40 % gel 15-30 g    Or     dextrose 50 % injection 25-50 mL    Or     glucagon injection 1 mg     isosorbide mononitrate (IMDUR) 24 hr tablet 30 mg     No current outpatient prescriptions on file.       Current Diet    Active Diet Order  Moderate Consistent CHO Diet      Assessment    Ramón Galarza is a 76 year old female with a past medical history of HTN, HLD, DM2, PAD s/p right AKA, moderate right ICA stenosis, SSS s/p pacemaker, CAD s/p PCI, CABG in 2015, ischemic cardiomyopathy (ECHO in 08/2015 moderate-severe apical wall hypokinesis, EF of 45-50%, mild mitral regurgitation), CKD who is admitted to station 3B for depression w/ suicidal ideation.    DMT2 During interview Patient quoted different doses of lantus for BID administration and meal coverage.  Does not Carb count, but estimates size of meal to adjust meal time doses and states BG are higher at home than here in hospital today BG trend  by noon  Correction insulin held for noon and CHO coverage at noon was 1/15  "grams  PTA RX Lantus 56 units BID, Humalog 28 units for smaller meals, 30 units for larger meals per Endocrine note but patient stating taking 62 Lantus BID. 09/21/2016. A1C 7.9 on 09/21 (but is anemic to 10.6)    Plan  Will move lantus to once daily dosing , to assess need for \"off label\" BID dosing of lantus  Will need increase in CHO coverage, when effect of increased Lantus dose on BG trends apparent  +start at supper 1 unit per 12 grams CHO TID with meals   Appears to need increase in SS correction doses, will assess and adjust later today PRN  + Aspart SS correction 1 unit /25 mg/dl BG >140 AC an BG>200 HS    LANTUS to 55 units this AM  (with last evening's dose of 15 will have 70 units on board at approx 1100 AM today)  D/C bedtime dose and reassess for dose adjustment in AM 02/05/17      Estela Deal APRN [id number]    Diabetes Management Team job code: [id number]    "

## 2017-02-04 NOTE — PROGRESS NOTES
Patient's mood is labile, affect is full-range, but sad. Denies SI/SIB, hearing voices and anxiety. Reports mild depression, thinking and judgment are impaired with disorganized thoughts. Daughter visited and spent almost 1 hour with the patient. Patient was happy with the visit. Blood glucose monitoring completed this shift and patient received insulin as prescribed. Patient's BP was re-checked manually at 2145 and was 140/50. Patient took all her evening medications with no concerns. She is currently in bed resting.

## 2017-02-04 NOTE — PLAN OF CARE
Problem: Depressive Symptoms  Goal: Depressive Symptoms  Pt will remain safe without any self harm during hospitalization.  Patient will have decreased thoughts of self harm and/or suicidal ideation  Patient will report improved sleep and feeling rested upon waking.  Patient will have adequate daily oral intake.   Patient will have improvement in self-care, including personal hygiene.   Patient will verbalize and demonstrate an ability to cope for their age.   Patient will be able to participate and initiate activities and conversation with others.   Patient will discuss feelings of hopelessness and and express an interest in the future.   By discharge the patient will report an increase in sense of control over their current situation.(i.e by verbalizing coping techniques to help get their life back on track and/or naming people they can talk to when they need emotional assistance).   Outcome: Improving  48 Hour Nursing Assessment  Pt is active, hyperverbal, lacks boundaries when she discussess her mental health issues with others, especially visitors, lacks insight into her own ability to change her behaviors, unable to identify specific mental health symtpoms, reports feeling sadness that is situational to needing to move to [facility], she wavers on her agreement to go. Labile mood dependent on people and situation around her. She is pleasant, cheerful, laughing, until 1:1, then sad, depressed. Also, appears sad when sitting alone, pt appears lonely.     Medication compliant. Attempted to do education with what medications she is being given. She remarked that she does not know them and does not plan to know them, that medications are given to her and she just takes them.     Seen by endocrinology  BS 94, 158- pt seen with potato chip bag that was given to her by another pt, unknown when she ate them- no SS given for it.

## 2017-02-04 NOTE — CONSULTS
"BRIEF DIABETES MANAGEMENT note,   Full consult to follow    Will move lantus to once daily dosing , to assess need for \"off label\" BID dosing of lantus  Will need increase in CHO coverage, but will delay until   effect of increased Lantus dose on BG trends apparent  Appears to need increase in SS correction doses, will assess and adjust later today    LANTUS to 55 units this AM  (with last evening's dose of 15 will have 70 units on board at approx 1100 AM today)  D/C bedtime dose    Estela Deal, CNP  [phone]  Diabetes Team  "

## 2017-02-05 NOTE — PROGRESS NOTES
Illness Management Recovery model:  Self-Reflection & Planning.    Assessed patient's progress completing forms related to Illness Management Recovery (including Personal Plan of Care, Adult Coping Plan, and My Support and Coping Plan) and assisted as needed.    Encouraged patient to continue to consider triggers, wellness strategies, early warning signs, feedback from others, actions to take to prevent relapse, and coping strategies as part of a plan to remain well after leaving the hospital.

## 2017-02-05 NOTE — PROGRESS NOTES
Pt is visible and active hyper verbal in the milieu. She is social with peers, poor boundaries, pleasant and cooperative.  Pt is independent with cares. Pt showered with minimum help. Affect is full range.

## 2017-02-05 NOTE — PROGRESS NOTES
"   02/05/17 1100   Behavioral Health   Thinking distractable;poor concentration   Orientation person: oriented;place: oriented   Memory baseline memory   Insight poor   Judgement impaired   Eye Contact at examiner   Affect full range affect   Mood labile;mood is calm   Physical Appearance/Attire attire appropriate to age and situation   Hygiene well groomed   Suicidality other (see comments)  (denied)   Activity restless   Speech rambling;flight of ideas   Psychomotor / Gait (does not ambulate)   Coping/Psychosocial   Verbalized Emotional State depression;disbelief;sadness   Plan Of Care Reviewed With patient   Patient Agreement with Plan of Care agrees   Psycho Education   Type of Intervention structured groups   Response participates, initiates socially appropriate   Hours 0.5   Treatment Detail SR and Planning   Group Therapy Session   Group Attendance attended group session   Activities of Daily Living   Hygiene/Grooming independent   Oral Hygiene independent   Dress street clothes;independent   Laundry unable to complete   Room Organization independent   Activity   Activity Level of Assistance independent   Behavioral Health Interventions   Depression maintain safety precautions;maintain safe secure environment   Social and Therapeutic Interventions (Depression) encourage socialization with peers;encourage participation in therapeutic groups and milieu activities     Pt denied suicidal ideations and hallucinations. Pt reported feeling hopeful and happy. However, at times, pt reported feeling very \"sad and upset\". Pt says insightful things at time. Pt tends to ramble and get off topic, needing redirection and cues. Pts has poor boundaries w/other pts and also needs redirections with this. Pt has been attending groups and meals. No concerns or issues to report otherwise.   "

## 2017-02-05 NOTE — PROGRESS NOTES
Diabetes Consult Daily  Progress Note          Assessment/Plan:   Ramón Galarza is a 76 year old female with a past medical history of HTN, HLD, DM2, PAD s/p right AKA, moderate right ICA stenosis, SSS s/p pacemaker, CAD s/p PCI, CABG in 2015, ischemic cardiomyopathy (ECHO in 08/2015 moderate-severe apical wall hypokinesis, EF of 45-50%, mild mitral regurgitation), CKD who is admitted to [facility] for depression w/ suicidal ideation    Home regimen had fixed meal doses of 30-35 units TID and reported taking 62 units Lantus BID  BG profile improved with changes to insulin ratios and daily lantus coverage  Yesterdays noon  (prior to change in CHO coverage)  @1700 and elevations overnight Due to unsupervised snacking by patient and no CHO coverage, may need  increase in CHO coverage   FBG today 160 will increase lantus, and monitor BG trends today for additional CHO coverage adjustments    PLAN  Lantus 57 units every day  Started today  1 unit per 12 grams CHO TID with meals   Aspart SS correction 1 unit /25 mg/dl BG >140 AC an BG>200 HS  BGM AC and HS, 0200A  Will continue to follow      Interval History:     BG profile improved with changes to insulin ratios and lantus coverage  Yesterdays noon   @1700  RN states bag of potato chips found in patients room  Is for placement in assisted living with skilled care  Denies C/O no SOB,CP, N/V/D/ , afebrile, good appetite, engaging in activities on unit        Recent Labs  Lab 02/05/17  0809 02/05/17  0524 02/05/17  0206 02/04/17  2057 02/04/17  1659 02/04/17  1204  02/01/17  1300   GLC  --   --   --   --   --   --   --  103*   * 149* 190* 170* 226* 158*  < >  --    < > = values in this interval not displayed.            Review of Systems:      per interval history       Medications:       Active Diet Order  Moderate Consistent CHO Diet     Physical Exam:  Gen: Alert, up on unit, in NAD   HEENT: NC/AT, mucous membranes are  moist  Resp: Unlabored  Ext: No lower extremity edema   Neuro:oriented x3, communicating clearly  /49 mmHg  Pulse 61  Temp(Src) 97.1  F (36.2  C) (Tympanic)  Resp 16  Ht 1.524 m (5')  Wt 51.71 kg (114 lb)  BMI 22.26 kg/m2  SpO2 99%           Data:   A1C      8.1   2/2/2017  A1C      8.2   2/23/2016  A1C      8.2   2/11/2016  A1C      7.3   6/9/2015  A1C      7.6   5/20/2015           CBC RESULTS:   Recent Labs   Lab Test  02/01/17   1300   WBC  5.4   RBC  3.42*   HGB  10.6*   HCT  31.7*   MCV  93   MCH  31.0   MCHC  33.4   RDW  13.0   PLT  190     Recent Labs   Lab Test  02/01/17   1300  07/13/16   0744   NA  141  137   POTASSIUM  4.2  4.7   CHLORIDE  106  99   CO2  25  32   ANIONGAP  10  6   GLC  103*  270*   BUN  57*  47*   CR  1.38*  1.18*   KARO  9.1  9.7     Liver Function Studies -   Recent Labs   Lab Test  01/13/17   0809   05/28/15   0600   PROTTOTAL   --    --   5.7*   ALBUMIN   --    --   3.2*   BILITOTAL   --    --   1.9*   ALKPHOS   --    --   127   AST   --    --   35   ALT  5   < >  44    < > = values in this interval not displayed.     INR     1.06   2/1/2017  INR     1.17   5/26/2015  INR     1.79   5/19/2015  INR     1.01   5/9/2015  INR     1.13   11/11/2010  INR     1.14   11/9/2010  INR     0.97   10/11/2010  INR     2.88   3/23/2009  INR     1.85   3/22/2009  INR     1.33   3/21/2009  INR     1.11   3/20/2009  INR     1.04   3/19/2009        Estela Deal, CNP pager [phone]  Diabetes Management Job Code [id number]

## 2017-02-06 NOTE — PROGRESS NOTES
Diabetes Consult Daily  Progress Note          Assessment/Plan:   Ramón Galarza is a 76 year old female with a past medical history of HTN, HLD, DM2, PAD s/p right AKA, moderate right ICA stenosis, SSS s/p pacemaker, CAD s/p PCI, CABG in 2015, ischemic cardiomyopathy (ECHO in 08/2015 moderate-severe apical wall hypokinesis, EF of 45-50%, mild mitral regurgitation), CKD who is admitted to [facility] for depression w/ suicidal ideation    Home regimen had fixed meal doses of 30-35 units TID and reported taking 62 units Lantus BID  BG profile improved with changes to insulin ratios and daily lantus coverage  Yesterdays noon  (prior to change in CHO coverage)  @1700 and elevations into evening due to snacks available and staff not giving insulin coverage.  Staff will be reminded to cover snacks. May need  increase in CHO coverage   No changes to regimen today, and monitor BG trends today for additional CHO coverage adjustments  She may need fixed meal dosing when placed in long term facility    PLAN  Lantus 57 units every day    1 unit per 12 grams CHO TID with meals   Aspart SS correction 1 unit /25 mg/dl BG >140 AC an BG>200 HS  BGM AC and HS, 0200A  Will continue to follow      Interval History:     BG profile improved with changes to insulin ratios and lantus coverage on 02/04  Yesterdays noon  @1700 with continued elevation to 300 by 2230   RN states snacks are available at that time and staff has not been giving insulin coverage for snacks at all.  Staff will be reminded to do this  Is for placement in assisted living with skilled care  Denies C/O no SOB,CP, N/V/D/ , afebrile, good appetite, engaging in activities on unit        Recent Labs  Lab 02/06/17  1212 02/06/17  0756 02/06/17  0524 02/06/17  0222 02/05/17  2230 02/05/17  2120  02/01/17  1300   GLC  --   --   --   --   --   --   --  103*   * 267* 232* 233* 301* 257*  < >  --    < > = values in this interval  not displayed.            Review of Systems:      per interval history       Medications:       Active Diet Order  Moderate Consistent CHO Diet     Physical Exam:  Gen: Alert, up on unit, in NAD   HEENT: NC/AT, mucous membranes are moist  Resp: Unlabored  Ext: No lower extremity edema   Neuro:oriented x3, communicating clearly  /41 mmHg  Pulse 61  Temp(Src) 96.8  F (36  C) (Tympanic)  Resp 16  Ht 1.524 m (5')  Wt 50.485 kg (111 lb 4.8 oz)  BMI 21.74 kg/m2  SpO2 99%           Data:   A1C      8.1   2/2/2017  A1C      8.2   2/23/2016  A1C      8.2   2/11/2016  A1C      7.3   6/9/2015  A1C      7.6   5/20/2015           CBC RESULTS:   Recent Labs   Lab Test  02/01/17   1300   WBC  5.4   RBC  3.42*   HGB  10.6*   HCT  31.7*   MCV  93   MCH  31.0   MCHC  33.4   RDW  13.0   PLT  190     Recent Labs   Lab Test  02/01/17   1300  07/13/16   0744   NA  141  137   POTASSIUM  4.2  4.7   CHLORIDE  106  99   CO2  25  32   ANIONGAP  10  6   GLC  103*  270*   BUN  57*  47*   CR  1.38*  1.18*   KARO  9.1  9.7     Liver Function Studies -   Recent Labs   Lab Test  01/13/17   0809   05/28/15   0600   PROTTOTAL   --    --   5.7*   ALBUMIN   --    --   3.2*   BILITOTAL   --    --   1.9*   ALKPHOS   --    --   127   AST   --    --   35   ALT  5   < >  44    < > = values in this interval not displayed.     INR     1.06   2/1/2017  INR     1.17   5/26/2015  INR     1.79   5/19/2015  INR     1.01   5/9/2015  INR     1.13   11/11/2010  INR     1.14   11/9/2010  INR     0.97   10/11/2010  INR     2.88   3/23/2009  INR     1.85   3/22/2009  INR     1.33   3/21/2009  INR     1.11   3/20/2009  INR     1.04   3/19/2009        Estela Deal, CNP pager [phone]  Diabetes Management Job Code [id number]

## 2017-02-06 NOTE — PROGRESS NOTES
Brief Medicine Note:    Internal medicine following glucose and BPs:    DMII with nephropathy: Glucose remains elevated at 230s-300s despite increased medical management per endo notes, see note 2/7 for most details.   - Per endocrine: start lantus 57 units daily today, carb coverage of 1 unit:12 grams with meals, HSSI (orders placed by endo prior to this note)  - Will continue to follow   - Has OP follow up with endo already scheduled for 3/22    HTN, ischemic cardiomyopathy, CAD s/p PCI, CABG x2  in 2015, SSS s/p PPM: LIMA to LAD, SVT to diagonal. Follows with Dr. Baldwin of [facility], last visit 11/14/2016. ECHO in 08/2015 moderate-severe apical wall hypokinesis, EF of 45-50%, mild mitral regurgitation. EKG A paced at 60 BPM. Denies chest pain, edema, dyspnea. PTA on Coreg, bumex, ASA, simvastatin, Tricor, digoxin. BPs labile over past few days, ranging 140s-40s to 170s/50s. OP BL appears to be around 140s/60s. Last weight was 2/1/2017  - Continue PTA meds  - Please start daily weights (patient care order already in to notify medicine if weight gain >2 lbs in 1 day or >5 lbs in 1 week)  - Has cardiology OP scheduled for 3/22  - Continue I&Os     Medicine will continue to follow BPs, weight, and glucose levels.     Serene Monge PA-C  Internal Medicine [facility]  [phone]

## 2017-02-06 NOTE — PROGRESS NOTES
Per request of Ricarda pt's daughter, sent a referral to [facility] ([place] at [phone]).

## 2017-02-06 NOTE — PROGRESS NOTES
"Had a care conference with pt, pt's daughter, Ricarda and pt's , Lydia Dinero. Ricarda reports that she is abandoning her mom and will not be taking her home as she can no longer care for her. She would like the hospital to find a \"long term  Bed\" for pt.      Made nursing referrals to [facility] and [facility].    [facility] called and reports that they would not be able to meet pt's needs.  "

## 2017-02-06 NOTE — PROGRESS NOTES
Patient had a calm shift.    Patient did not require seclusion/restraints to manage behavior.    Ramón Galarza did participate in groups and was visible in the milieu.    Notable mental health symptoms during this shift:decreased energy  distractable    Patient is working on these coping/social skills: Distraction    Visitors during this shift included none.     Other information about this shift: Patient was up and about throughout the shift. Patient was cooperative and calm with staff and peers. No outbursts or labile behavior. Patient asked for help when it was needed. Patient did struggle having to concentrate on certain things, did not track that well, even while watching the game on the [employer].        02/05/17 2100   Behavioral Health   Thinking distractable;poor concentration   Orientation person: oriented;place: oriented   Memory baseline memory   Insight poor   Judgement impaired   Eye Contact at examiner   Affect full range affect   Mood mood is calm   Physical Appearance/Attire attire appropriate to age and situation   Hygiene well groomed   Suicidality other (see comments)  (none stated or observed )   Self Injury other (see comment)  (none stated or observed )   Activity withdrawn   Speech clear;coherent   Medication Sensitivity no stated side effects;no observed side effects   Psychomotor / Gait unsteady;slow   Activities of Daily Living   Hygiene/Grooming independent   Oral Hygiene independent   Dress scrubs (behavioral health)   Laundry unable to complete   Room Organization independent

## 2017-02-06 NOTE — PROGRESS NOTES
"Pt noted to have a foul odor when she stood to have her weight taken.  Pt was directed to her room. Pt found to have a small amount of light brown foul smelling discharge on her underwear. Pt was able to perform bowen care and change her underwear and pants with SBA. Pt denied vaginal/bladder/bowel pain. Pt did not notice odor until writer pointed it out to her. Pt reported that she is having a \"lot of gas\".   Pt ate fair at meals.   Pt is medication compliant.   Pt describes her mood as \"ok\" and that her family meeting was \"good\". Pt denies SI/SIB.  "
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
## 2017-02-07 NOTE — PROGRESS NOTES
Brief Medicine Note:    Internal medicine following BPs and DMII management.    1. HTN, ischemic cardiomyopathy, CAD s/p PCI, CABG x2  in 2015, SSS s/p PPM: LIMA to LAD, SVT to diagonal. Follows with Dr. Baldwin of [facility], last visit 11/14/2016. ECHO in 08/2015 moderate-severe apical wall hypokinesis, EF of 45-50%, mild mitral regurgitation. EKG A paced at 60 BPM. Denies chest pain, edema, dyspnea. PTA on Coreg, bumex, ASA, simvastatin, Tricor, digoxin. BPs stable near BL for >48 hours.  - Continue PTA meds  - Please start daily weights (patient care order already in to notify medicine if weight gain >2 lbs in 1 day or >5 lbs in 1 week)  - Has cardiology OP scheduled for 3/22  - Continue I&Os     2. DMII: Glucose significantly improved to 180s-260s over past 24 hours with endocrine recs.  - Will defer treatment and management to endocrine    Per discussion with nursing, patient will discharge today. Endocrine will manage insulin recs. Internal medicine will sign off at time of discharge. Please contact with questions.    Serene Monge PA-C  Internal Medicine [facility]  [phone]

## 2017-02-07 NOTE — PLAN OF CARE
"Problem: Depressive Symptoms  Goal: Depressive Symptoms  Pt will remain safe without any self harm during hospitalization.  Patient will have decreased thoughts of self harm and/or suicidal ideation  Patient will report improved sleep and feeling rested upon waking.  Patient will have adequate daily oral intake.   Patient will have improvement in self-care, including personal hygiene.   Patient will verbalize and demonstrate an ability to cope for their age.   Patient will be able to participate and initiate activities and conversation with others.   Patient will discuss feelings of hopelessness and and express an interest in the future.   By discharge the patient will report an increase in sense of control over their current situation.(i.e by verbalizing coping techniques to help get their life back on track and/or naming people they can talk to when they need emotional assistance).   Outcome: Improving  48 HOUR NURSING ASSESSMENT:  Pt is pleasant and cooperative. Pt denies depression and anxiety. Pt is unable to describe mood more than stating \"good\". Pt denies suicidal thoughts. Pt is interactive with staff and peers.   Pt has been independent with cares when items are set up for her as well as transferring. Pt speech is hypo verbal at times but this is improving. Pt appetite has been fair. Pt has documented sleep of 6.5 hours a night. Pt has been eating fair at meals.  Pt is medication compliant. In the last 48 hours patient has used prn tylenol 650 mg x1, maalox 30 ml x1, Tums x1, and trazadone 50 mg x1.   Pt continues to have elevated blood glucose levels. Endocrinology CNP has been working on adjusting insulin orders to simplify regimen for potential discharge at the end of the week.         "

## 2017-02-07 NOTE — PROGRESS NOTES
"                     Diabetes Consult Daily  Progress Note          Assessment/Plan:   Ramón Galarza is a 76 year old female with a past medical history of HTN, HLD, DM2, PAD s/p right AKA, moderate right ICA stenosis, SSS s/p pacemaker, CAD s/p PCI, CABG in 2015, ischemic cardiomyopathy (ECHO in 08/2015 moderate-severe apical wall hypokinesis, EF of 45-50%, mild mitral regurgitation), CKD who is admitted to station 3B for depression w/ suicidal ideation    Glucose stable above target.  Increased meal time aspart with improvement to 100s.    For discharge:  glargine 57 units every morning   aspart or lispro 10 units per meal  Check glucose before meals and at bedtime  Report to outpt diabetes team if BGs <100  Or >250 consistently  Pt has appt in [facility] on 3/22    Written instructions in AVS for pt/daughter reference.            Interval History:   Home regimen had fixed meal doses of 28-30 units Humalog TID and 62 units Lantus BID.  Pt's daughter reports pt's glucose was always high at her diabetes appts so her insulin doses were increased.  Discussed that with pt potentially missing insulin doses, she would develop higher glucose and then tolerate those higher doses insulin for stretches.  Discussed pt's oral intake being lower here on 3B.  RN notes pt eating larger portions of vegetables.  Discussed appropriate patient-specific glucose target is more conservative than the old target of .  When I said I didn't want to see Ramón have glucose less than 100, she said \"thank you\" with such sincerity it conveyed a long fear of low glucose.  We reviewed the benefit of tight glucose control on diabetes complications has to be weighed against the danger of low glucose.  Pt's daughter is taking her home today.  Then eventually will move to assisted living.    Currently on:  glargine 57 units every day    aspart 1 unit per 12 grams CHO TID with meals (increased to 1 unit per 6 grams today w/ better PP control)  Aspart SS " correction 1 unit /25 mg/dl BG >140 AC an BG>200 HS            Recent Labs  Lab 02/07/17  1214 02/07/17  0812 02/07/17  0214 02/06/17  2057 02/06/17  1726 02/06/17  1212  02/01/17  1300   GLC  --   --   --   --   --   --   --  103*   * 224* 191* 255* 256* 248*  < >  --    < > = values in this interval not displayed.            Review of Systems:      per interval history       Medications:       Active Diet Order  Moderate Consistent CHO Diet     Physical Exam:  Gen: Alert, up in wheelchair at dining table, in NAD. Daughter Ricarda at her side  HEENT: NC/AT, mucous membranes are moist  Resp: Unlabored  Neuro:oriented x3, communicating clearly  /45 mmHg  Pulse 60  Temp(Src) 97.9  F (36.6  C) (Tympanic)  Resp 16  Ht 1.524 m (5')  Wt 49.351 kg (108 lb 12.8 oz)  BMI 21.25 kg/m2  SpO2 99%           Data:   A1C      8.1   2/2/2017  A1C      8.2   2/23/2016  A1C      8.2   2/11/2016  A1C      7.3   6/9/2015  A1C      7.6   5/20/2015         Recent Labs   Lab Test  02/01/17   1300  07/13/16   0744   NA  141  137   POTASSIUM  4.2  4.7   CHLORIDE  106  99   CO2  25  32   ANIONGAP  10  6   GLC  103*  270*   BUN  57*  47*   CR  1.38*  1.18*   KARO  9.1  9.7     CBC RESULTS:   Recent Labs   Lab Test  02/01/17   1300   WBC  5.4   RBC  3.42*   HGB  10.6*   HCT  31.7*   MCV  93   MCH  31.0   MCHC  33.4   RDW  13.0   PLT  190     Anamika Leon APRN [id number]    Diabetes Management Job Code 0246

## 2017-02-07 NOTE — PROGRESS NOTES
[facility], [place]   Psychiatric Progress Note        Interim History:   The patient's care was discussed with the treatment team during the daily team meeting and/or staff's chart notes were reviewed.  Staff report patient has been calm, pleasant, cooperative. She is attending all groups. Her mood is good, affect bright, behavior is appropriate. Continues to be hyperverbal but better. She is happy that she is going back to her daughter's house and is aware that this is temporary. Patient is eating well and taking her meds.     Met with patient. Denies depression, anxiety, hallucinations, delusions. Denies SI/SIB. Her appetite  and sleep are good. Feels happy about daughter's decision to take her home and her decision to attend  every day. States she likes to be around people and likes the staff at the day care. Denies side effects of her medications.   We will make more referrals to various nursing home and see if patient will be approved. So far she has been turned down by [facility] and [facility]. Daughter Ricarda is planning to take some time off of work until patient is able to get to an AL. Of concern is daughter and patient's ability to manage patient's diabetes. Nursing will contact Internal Medicine and request simplified Insuline regiment. Tentative discharge later this week.          Medications:       insulin aspart   Subcutaneous TID w/meals     insulin glargine  57 Units Subcutaneous QAM AC     sertraline  50 mg Oral Daily     insulin aspart  1-10 Units Subcutaneous TID AC     insulin aspart  1-7 Units Subcutaneous At Bedtime     aspirin EC  81 mg Oral Daily     bumetanide  0.5 mg Oral BID     carvedilol  12.5 mg Oral BID w/meals     digoxin  125 mcg Oral Daily     ezetimibe (ZETIA) tablet 10 mg  10 mg Oral Daily     fenofibrate  160 mg Oral Daily     gabapentin  600 mg Oral TID     gemfibrozil (LOPID) tablet 600 mg  600 mg Oral BID AC     spironolactone  12.5 mg Oral  Daily     simvastatin  40 mg Oral At Bedtime     isosorbide mononitrate (IMDUR) 24 hr tablet 30 mg  30 mg Oral Daily          Allergies:     Allergies   Allergen Reactions     Lisinopril Cough     Losartan Other (See Comments)     Violent shaking     Niacin      Vitamin D           Labs:     Reviewed.          Psychiatric Examination:   Temp: 97.9  F (36.6  C) Temp src: Tympanic BP: 154/45 mmHg Pulse: 60   Resp: 16        Weight is 108 lbs 12.8 oz  Body mass index is 21.25 kg/(m^2).    Appearance: awake, alert  Attitude:  cooperative  Eye Contact:  good  Mood:  good  Affect:  appropriate and in normal range  Speech:  mumbling  Psychomotor Behavior:  no evidence of tardive dyskinesia, dystonia, or tics  Throught Process:  linear and goal oriented  Associations:  no loose associations  Thought Content:  no evidence of suicidal ideation or homicidal ideation, no auditory hallucinations present and no visual hallucinations present  Insight:  limited  Judgement:  limited  Oriented to:  time, person, and place  Attention Span and Concentration:  fair  Recent and Remote Memory:  fair         Precautions:     Behavioral Orders   Procedures     Code 1 - Restrict to Unit     Fall precautions     Routine Programming     As clinically indicated     Self Injury Precaution     Single Room     Status 15     Every 15 minutes.     Suicide precautions          DIagnoses:     1. Major Depressive Disorder , recurrent, moderate  2. DMII         Plan:     1. Continue Zoloft 50 mg qam.  2. Continue prn Hydroxyzine for anxiety, Trazodone for sleep, and Zyprexa for agitation  3. Internal Medicine to simplify Insuline regiment prior to discharge.   4. Care was coordinated wit the treatment team. Family meeting today.    5. Tentative discharge later this week.     Sanjay HARRIS DNP  Date: 02/07/2017  Time: 10:59 AM

## 2017-02-07 NOTE — PROGRESS NOTES
Pt was denied admittance to [facility] due to suicidal ideation and previous suicide attempt.

## 2017-02-07 NOTE — DISCHARGE INSTRUCTIONS
Behavioral Discharge Planning and Instructions   Summary:   You were admitted to the [facility] on February 1, 2017 for suicidal ideation. While on the unit, your symptoms stabilized. You deny any current suicidal or homicidal ideation. You are discharged today to [address] [place] [address]. [place]  [address].  Phone number: [phone]    Main Diagnosis:   Depression.     Major Treatments, Procedures and Findings:   Dr. Miranda, your treating psychiatrist, adjusted your medications and managed your care throughout your stay. An internal medicine consult was completed during your stay. You had the opportunity to participate in treatment programming while on the unit including occupational therapy, mental health support and education and spiritual services.     Symptoms to Report:   Increased confusion, losing more sleep, mood getting worse, and/or thoughts of suicide.     Lifestyle Adjustment:   Stay in touch with your psychiatrist for any questions, and contact the doctor as soon as you experience a recurrence of depressive symptoms. Don't wait for the symptoms to progress to severe before getting help.    Follow-up Appointments:     Pt's daughter, Ricarda, will schedule all follow up appointments.    :  Lydia Dinero  [facility]  [phone]    Resources:   [facility] Crisis Team (24 hour/7days a week): [phone].            Crisis Intervention: 677.636.9841 or 040-872-0383 (TTY: [phone]). Call anytime for help.   [employer] [place] [employer] Mental Illness (www.[place].t[place].org): 326.718.4059 or 133-278-8196.   Mental Health Consumer/Survivor Network of [place] (www.mhcsn.net): 863.230.8797 or 434-629-2831  Mental [employer] Association [place] (www.mentalhealth.org): 260.699.8910 or 717-416-3595    General Medication Instructions:   See your medication sheet(s) for instructions.   Take all medicines as directed. Make no changes unless your doctor suggests them.   Go to all your doctor  visits.   Be sure to have all your required lab tests. This way, your medicines can be refilled on time.   Do not use any drugs not prescribed by your doctor.   Avoid alcohol.    The treatment team has appreciated the opportunity to work with you.  We wish you the best in the future. You will be receiving a follow up call within the next three days from a representative from behavioral health. You have identified the best phone number to reach you as [phone]. If you have any questions or concerns our unit number is [phone].      DIABETES PLAN-  Lantus 57 units every  Morning  Novolog 10 units with each meal  Check blood sugar before meals and at bedtime  Report to diabetes clinic (Analisa or Dr. Arias) if glucose running consistently less than 100 or greater than 250.

## 2017-02-07 NOTE — PLAN OF CARE
Problem: Depressive Symptoms  Goal: Depressive Symptoms  Pt will remain safe without any self harm during hospitalization.  Patient will have decreased thoughts of self harm and/or suicidal ideation  Patient will report improved sleep and feeling rested upon waking.  Patient will have adequate daily oral intake.   Patient will have improvement in self-care, including personal hygiene.   Patient will verbalize and demonstrate an ability to cope for their age.   Patient will be able to participate and initiate activities and conversation with others.   Patient will discuss feelings of hopelessness and and express an interest in the future.   By discharge the patient will report an increase in sense of control over their current situation.(i.e by verbalizing coping techniques to help get their life back on track and/or naming people they can talk to when they need emotional assistance).   Outcome: Improving  Pt bright, social.  Reports she is happy because her daughter is going to allow her to come home and that she will go to day care every day.  Noted that [facility] note states that daughter does not want pt home. Somewhat hyperverbal.      Pt engaged in groups.  Social with peers.      Complained of gas and loose stools.  Discussed diet and determined it may be due to consuming broccoli daily, which she rarely eats at home.  PRN Mylanta helpful.

## 2017-02-07 NOTE — PROGRESS NOTES
Attended 2 of 2 OT groups. She was quick to be involved, initiated work and reminded of task she was working on from 2 days prior. Work was creative. Affect was bright. She stated plans for d/c today and stated to be happy about plans. More organized with work and planning on familiar steps.

## 2017-02-07 NOTE — PROGRESS NOTES
Pt's daughter arrived on the unit and is requesting that patient be discharged.   Order received for discharge.  Anjali/PA/medical requests that medical meds remain the same as she is currently receiving.  Anamika /ROSETTA/endocrinology will review insulin orders.  Pt is happy to be discharging.   AVS was reviewed with patient and her daughter Ricarda.   Pt denied SI/SIB at time of discharge.

## 2017-02-22 NOTE — TELEPHONE ENCOUNTER
Called Misty BRISCOE from [facility] per daughters request.  She did not answer. Left a message to call team 4 with the direct number.

## 2017-02-22 NOTE — TELEPHONE ENCOUNTER
Spoke with Misty BRISCOE at [facility].  She stated she would like a clarification on patient's carvedilol dosage.  Carvedilol order printed and faxed to Misty BRISCOE at [phone].

## 2017-02-22 NOTE — TELEPHONE ENCOUNTER
Pts daughter called stating her mother was placed in assisted living at [facility] and they need an updated med list.

## 2017-04-03 NOTE — TELEPHONE ENCOUNTER
Received a vm from pt daughter stating that she would like to go over pt medications as pt is in an assisted living and pt daughter does not believe pt is on right medications.     Writer called pt daughter back. Pt daughter states that pt has not been on simvastatin since she moved into the assisted living in February. Pt daughter reports that assisted living does not have any records that pt is on this medication. Pt daughter states that Dr. Baldwin would have to fax a rx [facility] simvastatin [phone].     3/23/17:  Value Ref Range    CHOLESTEROL,TOTAL 302 (H) 100 - 199 mg/dL   TRIGLYCERIDES 1201 (H) <150 mg/dL   HDL CHOLESTEROL 25 (L) >40 mg/dL   NON-HDL CHOLESTEROL 277 (H) <145 mg/dl   CHOL/HDL RATIO  12.08 (H) <4.50    LDL CHOLESTEROL   Comment:   Invalid LDL when Trig >400, reflexed to measured LDL   Invalid LDL when Trig >400, reflexed to measured LDL          PATIENT STATUS  FASTING          Writer will route to Dr. Baldwin to review lab results and see if he would like pt to resume simvastatin 40mg, increase the dose, or wait until pt is seen in office on 5/3/17 to make changes.     Chart reviewed: 11/14/16:        IMPRESSION AND PLAN:   1. Coronary artery disease, status post prior CABG and percutaneous coronary interventions.   2. Ischemic cardiomyopathy, EF 45%-50%.   3. Peripheral vascular disease, status post right ??-the-knee amputation on the right.   4. Insulin-dependent diabetes.   6. Moderate right internal carotid artery stenosis.   7. Hypertriglyceridemia.   8. Hypertension.       Ms. Galarza remains stable from a cardiac standpoint. She is not endorsing any symptoms at this point including chest pain or shortness of breath. She is on a reasonable medical program. However, her triglycerides continue to be significantly elevated.       I recommended that she continue taking the simvastatin. In addition, we will add Tricor to her medical program. We will recheck her lipids in approximately 2  months. At that time. if her triglycerides are still elevated, then we will increase the Tricor dose.       In the meantime, I recommended that she avoid sweets and excessive carbohydrate intake.       She will continue the rest of her medical program. We will see her in approximately 6 months for followup, but sooner if she develops any cardiac symptoms.       I appreciate the opportunity to participate in this patient's care.

## 2017-04-03 NOTE — TELEPHONE ENCOUNTER
Writer spoke with Dr. Baldwin and he would like pt to resume taking simvastatin 40mg daily.     Writer called pt daughter back and informed her of this. Writer asked pt daughter if she knows the protocol at [facility] (assisted living where pt lives) for new medications. Writer asked pt daughter if writer could just send an escript over or if the facility needs a written prescription. Pt daughter was unsure and gave writer JosefaRN number [phone].    Writer attempted to call Josefa to determine this but josefa is out of the office until Thursday. Writer attempted to speak with Samantha jefferson RN. No answer. Writer left  to call team 4 back directly.

## 2017-04-17 ENCOUNTER — TELEPHONE (OUTPATIENT)
Dept: CARDIOLOGY | Facility: CLINIC | Age: 77
End: 2017-04-17

## 2017-04-17 NOTE — TELEPHONE ENCOUNTER
Received a vm from pt daughter stating that she would like to cancel the appt with Dr. Baldwni for next week as pt has passed away last Thursday 4/13/17.     Writer called pt daughter back and gave condolences and verified that appt will be cancelled. No further questions or concerns at this time. Writer cancelled appts    Writer updated medical records.

## 2017-05-03 NOTE — TELEPHONE ENCOUNTER
Writer accessed pt chart per Dr. Baldwin verbal order. Dr. Baldwin sending sympathy card to pt family. Writer accessed to obtain family address.

## 2019-10-20 NOTE — TELEPHONE ENCOUNTER
"Called patient's daughter Ricarda per note in chart and informed her Dr. Baldwin reviewed the labs \"Labs reviewed. Triglycerides are still high. Let's increase Tricor to 160 mg daily\"  She stated she understood.  Tricor ordered and sent to pharmacy of choice. No further questions.   " ear pain
